# Patient Record
Sex: MALE | Race: WHITE | Employment: UNEMPLOYED | URBAN - METROPOLITAN AREA
[De-identification: names, ages, dates, MRNs, and addresses within clinical notes are randomized per-mention and may not be internally consistent; named-entity substitution may affect disease eponyms.]

---

## 2017-08-17 ENCOUNTER — HOSPITAL ENCOUNTER (OUTPATIENT)
Dept: GENERAL RADIOLOGY | Age: 62
Discharge: HOME | End: 2017-08-17
Attending: NURSE PRACTITIONER | Admitting: NURSE PRACTITIONER

## 2017-08-17 DIAGNOSIS — M54.5 LOW BACK PAIN, UNSPECIFIED BACK PAIN LATERALITY, UNSPECIFIED CHRONICITY, WITH SCIATICA PRESENCE UNSPECIFIED: ICD-10-CM

## 2017-08-22 ENCOUNTER — TELEPHONE (OUTPATIENT)
Dept: CARDIOLOGY CLINIC | Age: 62
End: 2017-08-22

## 2017-08-25 RX ORDER — NAPROXEN SODIUM 220 MG
220 TABLET ORAL
COMMUNITY
End: 2018-02-19 | Stop reason: ALTCHOICE

## 2017-08-25 RX ORDER — METHOCARBAMOL 750 MG/1
750 TABLET, FILM COATED ORAL 4 TIMES DAILY
COMMUNITY
End: 2017-09-06 | Stop reason: ALTCHOICE

## 2017-08-25 RX ORDER — SERTRALINE HYDROCHLORIDE 100 MG/1
100 TABLET, FILM COATED ORAL
COMMUNITY

## 2017-09-06 ENCOUNTER — INITIAL CONSULT (OUTPATIENT)
Dept: CARDIOLOGY CLINIC | Age: 62
End: 2017-09-06

## 2017-09-06 VITALS
BODY MASS INDEX: 24.57 KG/M2 | HEART RATE: 56 BPM | HEIGHT: 73 IN | DIASTOLIC BLOOD PRESSURE: 80 MMHG | WEIGHT: 185.4 LBS | SYSTOLIC BLOOD PRESSURE: 110 MMHG

## 2017-09-06 DIAGNOSIS — Z76.89 ESTABLISHING CARE WITH NEW DOCTOR, ENCOUNTER FOR: ICD-10-CM

## 2017-09-06 DIAGNOSIS — F51.01 PRIMARY INSOMNIA: ICD-10-CM

## 2017-09-06 DIAGNOSIS — R06.02 SOB (SHORTNESS OF BREATH): ICD-10-CM

## 2017-09-06 DIAGNOSIS — R00.1 BRADYCARDIA: Primary | ICD-10-CM

## 2017-09-06 DIAGNOSIS — R53.82 CHRONIC FATIGUE: ICD-10-CM

## 2017-09-06 PROBLEM — G47.00 INSOMNIA DISORDER: Status: ACTIVE | Noted: 2017-09-06

## 2017-09-06 PROCEDURE — 93000 ELECTROCARDIOGRAM COMPLETE: CPT | Performed by: INTERNAL MEDICINE

## 2017-09-06 PROCEDURE — 99244 OFF/OP CNSLTJ NEW/EST MOD 40: CPT | Performed by: INTERNAL MEDICINE

## 2017-09-07 ENCOUNTER — TELEPHONE (OUTPATIENT)
Dept: CARDIOLOGY CLINIC | Age: 62
End: 2017-09-07

## 2017-09-11 ENCOUNTER — NURSE ONLY (OUTPATIENT)
Dept: CARDIOLOGY CLINIC | Age: 62
End: 2017-09-11

## 2017-09-11 DIAGNOSIS — R00.1 BRADYCARDIA: Primary | ICD-10-CM

## 2017-09-11 DIAGNOSIS — Z76.89 ESTABLISHING CARE WITH NEW DOCTOR, ENCOUNTER FOR: ICD-10-CM

## 2017-09-11 DIAGNOSIS — F51.01 PRIMARY INSOMNIA: ICD-10-CM

## 2017-09-11 PROCEDURE — 93225 XTRNL ECG REC<48 HRS REC: CPT | Performed by: INTERNAL MEDICINE

## 2017-09-13 ENCOUNTER — PROCEDURE VISIT (OUTPATIENT)
Dept: CARDIOLOGY CLINIC | Age: 62
End: 2017-09-13

## 2017-09-13 DIAGNOSIS — R06.02 SOB (SHORTNESS OF BREATH): Primary | ICD-10-CM

## 2017-09-13 PROCEDURE — 93018 CV STRESS TEST I&R ONLY: CPT | Performed by: INTERNAL MEDICINE

## 2017-09-13 PROCEDURE — 93016 CV STRESS TEST SUPVJ ONLY: CPT | Performed by: INTERNAL MEDICINE

## 2017-09-14 ENCOUNTER — PROCEDURE VISIT (OUTPATIENT)
Dept: CARDIOLOGY CLINIC | Age: 62
End: 2017-09-14

## 2017-09-14 DIAGNOSIS — R00.1 BRADYCARDIA: ICD-10-CM

## 2017-09-14 DIAGNOSIS — F51.01 PRIMARY INSOMNIA: ICD-10-CM

## 2017-09-14 DIAGNOSIS — R53.82 CHRONIC FATIGUE: ICD-10-CM

## 2017-09-14 DIAGNOSIS — R00.1 BRADYCARDIA: Primary | ICD-10-CM

## 2017-09-14 DIAGNOSIS — R06.02 SOB (SHORTNESS OF BREATH): ICD-10-CM

## 2017-09-14 DIAGNOSIS — Z76.89 ESTABLISHING CARE WITH NEW DOCTOR, ENCOUNTER FOR: ICD-10-CM

## 2017-09-14 LAB
LV EF: 53 %
LV EF: 55 %
LVEF MODALITY: NORMAL
LVEF MODALITY: NORMAL

## 2017-09-14 PROCEDURE — A9500 TC99M SESTAMIBI: HCPCS | Performed by: INTERNAL MEDICINE

## 2017-09-14 PROCEDURE — 93016 CV STRESS TEST SUPVJ ONLY: CPT | Performed by: INTERNAL MEDICINE

## 2017-09-14 PROCEDURE — 93306 TTE W/DOPPLER COMPLETE: CPT | Performed by: INTERNAL MEDICINE

## 2017-09-14 PROCEDURE — 93017 CV STRESS TEST TRACING ONLY: CPT | Performed by: INTERNAL MEDICINE

## 2017-09-14 PROCEDURE — 78452 HT MUSCLE IMAGE SPECT MULT: CPT | Performed by: INTERNAL MEDICINE

## 2017-09-14 PROCEDURE — 93018 CV STRESS TEST I&R ONLY: CPT | Performed by: INTERNAL MEDICINE

## 2017-09-15 ENCOUNTER — TELEPHONE (OUTPATIENT)
Dept: CARDIOLOGY CLINIC | Age: 62
End: 2017-09-15

## 2017-09-19 ENCOUNTER — HOSPITAL ENCOUNTER (OUTPATIENT)
Dept: GENERAL RADIOLOGY | Age: 62
Discharge: HOME | End: 2017-09-19
Attending: INTERNAL MEDICINE | Admitting: INTERNAL MEDICINE

## 2017-09-19 ENCOUNTER — OFFICE VISIT (OUTPATIENT)
Dept: CARDIOLOGY CLINIC | Age: 62
End: 2017-09-19

## 2017-09-19 VITALS
BODY MASS INDEX: 24.78 KG/M2 | HEIGHT: 73 IN | DIASTOLIC BLOOD PRESSURE: 64 MMHG | WEIGHT: 187 LBS | SYSTOLIC BLOOD PRESSURE: 116 MMHG | HEART RATE: 57 BPM | OXYGEN SATURATION: 97 %

## 2017-09-19 DIAGNOSIS — R06.02 SOB (SHORTNESS OF BREATH): ICD-10-CM

## 2017-09-19 DIAGNOSIS — R94.39 ABNORMAL STRESS TEST: ICD-10-CM

## 2017-09-19 DIAGNOSIS — Z01.818 PRE-OP EXAM: ICD-10-CM

## 2017-09-19 DIAGNOSIS — R00.1 BRADYCARDIA: Primary | ICD-10-CM

## 2017-09-19 DIAGNOSIS — R53.82 CHRONIC FATIGUE: ICD-10-CM

## 2017-09-19 LAB
ANION GAP SERPL CALCULATED.3IONS-SCNC: 15 MMOL/L (ref 4–16)
APTT: 39.4 SECONDS (ref 21.2–33)
BUN BLDV-MCNC: 17 MG/DL (ref 6–23)
CALCIUM SERPL-MCNC: 9.3 MG/DL (ref 8.3–10.6)
CHLORIDE BLD-SCNC: 101 MMOL/L (ref 99–110)
CO2: 26 MMOL/L (ref 21–32)
CREAT SERPL-MCNC: 0.8 MG/DL (ref 0.9–1.3)
GFR AFRICAN AMERICAN: >60 ML/MIN/1.73M2
GFR NON-AFRICAN AMERICAN: >60 ML/MIN/1.73M2
GLUCOSE BLD-MCNC: 81 MG/DL (ref 70–140)
HCT VFR BLD CALC: 43.3 % (ref 42–52)
HEMOGLOBIN: 12.6 GM/DL (ref 13.5–18)
INR BLD: 1.12 INDEX
MCH RBC QN AUTO: 22.3 PG (ref 27–31)
MCHC RBC AUTO-ENTMCNC: 29.1 % (ref 32–36)
MCV RBC AUTO: 76.8 FL (ref 78–100)
PDW BLD-RTO: 17.7 % (ref 11.7–14.9)
PLATELET # BLD: 328 K/CU MM (ref 140–440)
PMV BLD AUTO: 10.4 FL (ref 7.5–11.1)
POTASSIUM SERPL-SCNC: 4.9 MMOL/L (ref 3.5–5.1)
PROTHROMBIN TIME: 12.8 SECONDS (ref 9.12–12.5)
RBC # BLD: 5.64 M/CU MM (ref 4.6–6.2)
SODIUM BLD-SCNC: 142 MMOL/L (ref 135–145)
WBC # BLD: 11.2 K/CU MM (ref 4–10.5)

## 2017-09-19 PROCEDURE — 99214 OFFICE O/P EST MOD 30 MIN: CPT | Performed by: INTERNAL MEDICINE

## 2017-09-19 RX ORDER — GABAPENTIN 100 MG/1
100 CAPSULE ORAL 2 TIMES DAILY
COMMUNITY
Start: 2017-09-14 | End: 2017-11-01 | Stop reason: ALTCHOICE

## 2017-09-20 ENCOUNTER — TELEPHONE (OUTPATIENT)
Dept: CARDIOLOGY CLINIC | Age: 62
End: 2017-09-20

## 2017-09-20 PROCEDURE — 93227 XTRNL ECG REC<48 HR R&I: CPT | Performed by: INTERNAL MEDICINE

## 2017-09-21 ENCOUNTER — HOSPITAL ENCOUNTER (OUTPATIENT)
Dept: CARDIAC CATH/INVASIVE PROCEDURES | Age: 62
Discharge: HOME | End: 2017-10-20
Attending: INTERNAL MEDICINE | Admitting: INTERNAL MEDICINE

## 2017-09-28 ENCOUNTER — TELEPHONE (OUTPATIENT)
Dept: CARDIOLOGY CLINIC | Age: 62
End: 2017-09-28

## 2017-10-24 ENCOUNTER — TELEPHONE (OUTPATIENT)
Dept: CARDIOLOGY CLINIC | Age: 62
End: 2017-10-24

## 2017-11-01 ENCOUNTER — TELEPHONE (OUTPATIENT)
Dept: CARDIOLOGY CLINIC | Age: 62
End: 2017-11-01

## 2017-11-01 ENCOUNTER — OFFICE VISIT (OUTPATIENT)
Dept: CARDIOLOGY CLINIC | Age: 62
End: 2017-11-01

## 2017-11-01 VITALS
HEART RATE: 60 BPM | WEIGHT: 185 LBS | HEIGHT: 74 IN | BODY MASS INDEX: 23.74 KG/M2 | SYSTOLIC BLOOD PRESSURE: 112 MMHG | DIASTOLIC BLOOD PRESSURE: 72 MMHG

## 2017-11-01 DIAGNOSIS — R06.02 SOB (SHORTNESS OF BREATH): ICD-10-CM

## 2017-11-01 DIAGNOSIS — R94.39 ABNORMAL STRESS TEST: Primary | ICD-10-CM

## 2017-11-01 DIAGNOSIS — R00.1 BRADYCARDIA: ICD-10-CM

## 2017-11-01 PROCEDURE — 99214 OFFICE O/P EST MOD 30 MIN: CPT | Performed by: INTERNAL MEDICINE

## 2017-11-01 NOTE — PROGRESS NOTES
Patient here in office and educated on LHC with poss PTCA, schedule for 11/06/17 @ 10:00, with arrival @ 8:00, @ [address]; risk explained; and consents signed. Also copy of orders given for labs and CXR due 11/03/17 at [facility]. Instruction given to patient to :  NPO after midnight the night before procedure; call hospital at [phone] to pre-register. May take rest of morning meds of procedure. Patient voiced understanding. Copies of consent & info sheet given to Nargis for scanning.

## 2017-11-01 NOTE — PROGRESS NOTES
CARDIOLOGY CONSULT NOTE    Chief Complaint: Fatigue / SOB     HPI:   Vito is a 60y.o. year old who has history as noted below. He had a stress test but was unable to complete that it was submaximal treadmill. He then had a Lexiscan which is abnormal showing anterior wall ischemia,He was advised to get heart cath but he did not show up for the procedure on the day of his heart cath. He says he decided he did not need it. He is still tired and feels dizzy when he stnds up. He is short of breath but denies any chest pain. He is being seen for ongoing shortness of breath and fatigue. He was noted to have bradycardia on his EKG . He says that he's been fatigued for more than a year now. When he goes out taking his dog for a walk, he has to stop several times because he is tired and gets short of breath. He denies any ankle swelling. He does not report any chest pressure or pain. He is not on any medication except for antidepressants. He used to smoke but quit many years ago. He does report that he does not sleep very well and is often up at night      Current Outpatient Prescriptions   Medication Sig Dispense Refill    sertraline (ZOLOFT) 100 MG tablet Take 100 mg by mouth Take 2 tablets daily      naproxen sodium (ALEVE) 220 MG tablet Take 220 mg by mouth       sertraline (ZOLOFT) 100 MG tablet Take 100 mg by mouth 2 times daily. No current facility-administered medications for this visit. Allergies:   Review of patient's allergies indicates no known allergies.     Patient History:  Past Medical History:   Diagnosis Date    Anxiety     Back problem     \"Lower Back Hurts Sometimes\"    Bipolar disorder ([facility] [address]     Depression     History of exercise stress test 09/13/2017    treadmill    History of nuclear stress test 09/14/2017    cardiolite-moderate ischemia anterior wall LAD    Hx of echocardiogram 09/14/2017    EF50-55%,mildly dilated left atrium [facility] OTHER MEDICAL     Primary Care Physician Is At [address] [place]    Panic attacks     Shortness of breath on exertion     Teeth missing     Upper And Lower    Wears glasses      Past Surgical History:   Procedure Laterality Date    INGUINAL HERNIA REPAIR Right 06/24/14     Family History   Problem Relation Age of Onset    Heart Attack Father     High Cholesterol Father     Cancer Father      Skin Cancer    Heart Disease Father      Heart Attack    Early Death Brother 58     Throat Cancer    Cancer Brother      Throat Cancer    Other Sister      Rheumatic Fever, Pituitary Gland Problems    Other Brother      Overweight, Back Problems     Social History   Substance Use Topics    Smoking status: Former Smoker     Packs/day: 1.00     Years: 21.00     Types: Cigarettes    Smokeless tobacco: Never Used    Alcohol use No      Comment: \"Quit 2012, Was Occ\"        Review of Systems:   · Constitutional: No Fever or Weight Loss   · Eyes: No Decreased Vision  · ENT: No Headaches, Hearing Loss or Vertigo  · Cardiovascular: as per note above   · Respiratory: No cough or wheezing and as per note above.    · Gastrointestinal: No abdominal pain, appetite loss, blood in stools, constipation, diarrhea or heartburn  · Genitourinary: No dysuria, trouble voiding, or hematuria  · Musculoskeletal:  None  · Integumentary: No rash or pruritis  · Neurological: No TIA or stroke symptoms  · Psychiatric: No anxiety or depression  · Endocrine: No malaise, fatigue or temperature intolerance  · Hematologic/Lymphatic: No bleeding problems, blood clots or swollen lymph nodes  · Allergic/Immunologic: No nasal congestion or hives    Objective:      Physical Exam:  /72   Pulse 60   Ht 6' 2\" (1.88 m)   Wt 185 lb (83.9 kg)   BMI 23.75 kg/m²   Wt Readings from Last 3 Encounters:   11/01/17 185 lb (83.9 kg)   09/19/17 187 lb (84.8 kg)   09/06/17 185 lb 6.4 oz (84.1 kg)     Body mass index is 23.75 noted to have PAC   Submaximal ECG stress test. consider lexiscan to evaluate further     Lexiscan 9/114/17  Abnormal Study/ abnormal stress test    Normal EF 55 % with normal ventricular contractility.    Medium size moderate ischemia of anterior wall in stress images consistent    with LAD ischemia     Echo: 9/14/17   Summary   Left ventricular function is normal.   Ejection fraction is visually estimated at 50% to 55%   Mildly dilated left atrium.   No significant valvular abnormalities.   No evidence of any pericardial effusion. Holter 48 Hrs 9/23/17  No significant bradycardia or pause , no signficnat SVE or VE burden    All labs, medications and tests reviewed by myself including data and history from outside source , patient and available family . Assessment & Plan:      1. Abnormal stress test    2. Bradycardia    3. SOB (shortness of breath)       Abnormal stress test  He has reduced excise tolerance and a stress test which shows Possible anterior wall ischemia. We will proceed with cardiac cath to evaluate further. Alternates and benefits were discussed in detail. Rolo's test is normal.He did not show up for his heart cath last time . He is tired all the time and prefers not to be on meds . WE will try calcium channel blockers for anti anginal instead of beat blockers ,. His main complaints are fatigue and dizziness. Bradycardia  No events of bradycardia     Chronic fatigue  This could be related to his insomnia. He says he takes afternoon naps because he is tired. He tells me that this part of her numbers were normal and they were recently checked. Check tsh and lipid panel      Dyslipidemia :  Max had lab work recently,  Check lipid panel    Counseled extensively and medication compliance urged. We discussed that for the  prevention of ASCVD our  goal is aggressive risk modification. Patient is encouraged to exercise even a brisk walk for 30 minutes  at least 3 to 4 times a week   Various

## 2017-11-01 NOTE — TELEPHONE ENCOUNTER
Left a message for the patient instructing him that I left more lab orders at the  that he will need to be fasting foe but can have them done on Friday with the pre labs for the [facility].  If you have any questions, please call our office at [phone]

## 2017-11-01 NOTE — LETTER
[facility]     LEFT HEART CATHETERIZATION WITH POSSIBLE PERCUTANEOUS CORONARY INTERVENTION     Patient Name: Albertina Vazquez   : 1955   MRN# [id number]    Date of Procedure: 17 Time: 10:00 Arrival Time: 8:00    The catheterization and angiogram are usually outpatient procedures, however if stenting is needed you will stay overnight. You will need to be at the hospital two hours before the procedure. You will need to arrange for someone to drive you home. You will go to registration in the main lobby. HOSPITAL:  [facility]  Call to Pre-[place] at: [phone] 1-2 days before your procedure. Please have blood work and chest-x-ray done 1 to 2 days before procedure at    [facility]. X Please do not have anything by mouth after midnight prior to or 8 hours  before the procedure. X You may take your medications with a sip of water in the morning before  your procedure or take them with you. Patient Signature:  _________________________ Staff Signature: William Paget Dr. Marland Fears     Patient Name: Albertina Vazquez   : 1955   MRN# [id number]    Date of Procedure: 17 Time: 10:00      LEFT HEART CATHETERIZATION WITH POSSIBLE PERCUTANEOUS CORONARY INTERVENTION        X Chest x-Ray PA & Lateral View    X Type & Screen     X CBC  X BMP  X PT  X PTT            ? PLEASE CALL ABNORMAL RESULTS TO THE  PHYSICIAN? ATTENTION PATIENT: Pretesting is to be done before the cath. You do not have to fast for the lab work. You must go to the [facility] behind [facility] at [address] [place]. to have this lab work done.   Phone: [phone] Hours: 7:00 am to 5:00 pm             PHYSICIAN SIGNATURE:      DATE: ? Observers or use of photography, video/audio recording, or televising of the procedure(s). This is for medical, scientific, or educational purposes. This includes appropriate portions of my body. My identity will not be revealed. ? I consent to release of my social security number and other identifying information to [address] (FDA), and the supplier/, if I receive tissue, a device, or implant. This is to track the tissue, device, or implant for defect, recall, infection, etc.     ? Use of blood and/or blood products, if needed, through my hospital stay. My practitioner has advised me of the risks of using, risks of not using, benefits, side effects, and alternatives. ___ I do NOT want Blood or Blood products given. (Complete separate  refusal form)    Code Status (gabby one):  ___ I do NOT HAVE a DNR order. I am a Full-code.   I will receive CPR, intubation,  chest compressions, medications, and/or other life saving measures if I have a  cardiac or respiratory arrest.    ___ I have a Do Not Resuscitate (DNR)order.   (gabby one below)  ___  I rescind my DNR for surgery and immediate post-operative period through Phase 2 recovery. This means, for that time period, I will be a Full-code and receive CPR, intubation, chest compressions, medications, and/or other life saving measures, if I have a cardiac or respiratory arrest.    ___ I WANT to keep my DNR in effect during my procedure(s) and immediate post-operative recovery period through Phase 2 recovery. (Complete separate refusal form)     This form has been fully explained to me. I understand its contents.       Patients Signature: ___________________________Date: ________  Time: ________    If patient unable to sign, has engaged the [address] [place] [address] in2nite, is a minor, or has a court-appointed Guardian:  [facility] Representative Name (Print): ____________________________________      Relationship (Upson one):    Guardian   Parent    Spouse    HCPOA   Child   Sibling  Next-of-Kin Friend    Patients Representative Signature: _______________________________________              Date: ______________  Time: __________    An  was used.  name/ID: _________________________________      [facility] Witness________________________  Date: ________   Time: _________    Physician/Practitioner _______________________  Date: ________   Time: _________         Revision 2017        Darryle Cocks Dr. Larita Gable Rizvi    PROCEDURE TO SCHEDULE:    LEFT HEART CATHETERIZATION WITH POSSIBLE PERCUTANEOUS CORONARY INTERVENTION       Patient Name: Selina Santiago   : 1955   MRN# [id number]    Home Phone Number: [phone]   Weight:    Wt Readings from Last 3 Encounters:   17 185 lb (83.9 kg)   17 187 lb (84.8 kg)   17 185 lb 6.4 oz (84.1 kg)        Insurance: Payor: / No coverage found.     Date of Procedure: 17 Time: 10:00 Arrival Time: 8:00    Diagnosis:    Allergies: No Known Allergies     1) Call [facility] scheduling ([phone]) or Instant Message  CONFIRMED WITH     PHONE OR   INSTANT MESSAGE  2) PREAUTHORIZATION NUMBER:    Spoke to:      From date:     expiration date:        Sosa Valverde

## 2017-11-03 ENCOUNTER — HOSPITAL ENCOUNTER (OUTPATIENT)
Dept: GENERAL RADIOLOGY | Age: 62
Discharge: HOME | End: 2017-11-03
Attending: INTERNAL MEDICINE | Admitting: INTERNAL MEDICINE

## 2017-11-03 DIAGNOSIS — Z01.811 PRE-OP CHEST EXAM: ICD-10-CM

## 2017-11-03 LAB
ANION GAP SERPL CALCULATED.3IONS-SCNC: 13 MMOL/L (ref 4–16)
APTT: 39.6 SECONDS (ref 21.2–33)
BUN BLDV-MCNC: 20 MG/DL (ref 6–23)
CALCIUM SERPL-MCNC: 9 MG/DL (ref 8.3–10.6)
CHLORIDE BLD-SCNC: 98 MMOL/L (ref 99–110)
CHOLESTEROL: 153 MG/DL
CO2: 27 MMOL/L (ref 21–32)
CREAT SERPL-MCNC: 0.9 MG/DL (ref 0.9–1.3)
GFR AFRICAN AMERICAN: >60 ML/MIN/1.73M2
GFR NON-AFRICAN AMERICAN: >60 ML/MIN/1.73M2
GLUCOSE BLD-MCNC: 87 MG/DL (ref 70–140)
HCT VFR BLD CALC: 42.2 % (ref 42–52)
HDLC SERPL-MCNC: 39 MG/DL
HEMOGLOBIN: 12.4 GM/DL (ref 13.5–18)
INR BLD: 1.18 INDEX
LDL CHOLESTEROL DIRECT: 94 MG/DL
MCH RBC QN AUTO: 22.1 PG (ref 27–31)
MCHC RBC AUTO-ENTMCNC: 29.4 % (ref 32–36)
MCV RBC AUTO: 75.4 FL (ref 78–100)
PDW BLD-RTO: 18 % (ref 11.7–14.9)
PLATELET # BLD: 335 K/CU MM (ref 140–440)
PMV BLD AUTO: 9.8 FL (ref 7.5–11.1)
POTASSIUM SERPL-SCNC: 4.7 MMOL/L (ref 3.5–5.1)
PROTHROMBIN TIME: 13.5 SECONDS (ref 9.12–12.5)
RBC # BLD: 5.6 M/CU MM (ref 4.6–6.2)
SODIUM BLD-SCNC: 138 MMOL/L (ref 135–145)
TRIGL SERPL-MCNC: 104 MG/DL
TSH HIGH SENSITIVITY: 1.92 UIU/ML (ref 0.27–4.2)
WBC # BLD: 11.5 K/CU MM (ref 4–10.5)

## 2017-11-08 ENCOUNTER — TELEPHONE (OUTPATIENT)
Dept: CARDIOLOGY CLINIC | Age: 62
End: 2017-11-08

## 2017-11-08 DIAGNOSIS — J84.10 LUNG GRANULOMA (HCC): ICD-10-CM

## 2017-11-08 DIAGNOSIS — R93.89 ABNORMAL CHEST X-RAY: Primary | ICD-10-CM

## 2017-11-08 NOTE — TELEPHONE ENCOUNTER
Left a messge for the patient to call back for the chest x ray results. FINDINGS:  The lungs are clear other than a chronic calcified right mid lung granuloma. The cardiac and mediastinal contours are normal.  There is no pleural  effusion or pneumothorax. No acute osseous abnormality is identified.     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

## 2017-11-09 ENCOUNTER — HOSPITAL ENCOUNTER (OUTPATIENT)
Dept: NUCLEAR MEDICINE | Age: 62
Discharge: HOME | End: 2017-12-08
Attending: NURSE PRACTITIONER | Admitting: NURSE PRACTITIONER

## 2017-11-09 DIAGNOSIS — E04.1 NONTOXIC SINGLE THYROID NODULE: ICD-10-CM

## 2017-11-10 NOTE — TELEPHONE ENCOUNTER
2nd Attempt to call the patient with the results of the chest xray and someone picks up the phone and hangs up. FINDINGS:  The lungs are clear other than a chronic calcified right mid lung granuloma. The cardiac and mediastinal contours are normal.  There is no pleural  effusion or pneumothorax.  No acute osseous abnormality is identified.     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

## 2017-11-13 ENCOUNTER — TELEPHONE (OUTPATIENT)
Dept: CARDIOLOGY CLINIC | Age: 62
End: 2017-11-13

## 2017-11-13 NOTE — TELEPHONE ENCOUNTER
3rd Attempt Left a message for the patientto call the office back fro the resutls of the chest X ray.      FINDINGS:  The lungs are clear other than a chronic calcified right mid lung granuloma. The cardiac and mediastinal contours are normal.  There is no pleural  effusion or pneumothorax.  No acute osseous abnormality is identified.     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

## 2017-11-13 NOTE — TELEPHONE ENCOUNTER
Patient returned my call about the chest X Ray results but the call was lost some how I called the patient right back and got the Voicemail left a message with my direct number. I spoke to the patient and he was given the results and ok to see a pulmonology. Sent referral to [facility]. FINDINGS:  The lungs are clear other than a chronic calcified right mid lung granuloma. The cardiac and mediastinal contours are normal.  There is no pleural  effusion or pneumothorax.  No acute osseous abnormality is identified.     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

## 2017-11-13 NOTE — TELEPHONE ENCOUNTER
Called to schedule consult per Dr Alia Jade to discuss possible pacemaker. Patient states he has appointment with Dr Alia Jade also on 12/1, I advised him we will cancel that and we can reschedule at a later time. Pt voiced understanding.

## 2017-11-29 ENCOUNTER — INITIAL CONSULT (OUTPATIENT)
Dept: PULMONOLOGY | Age: 62
End: 2017-11-29

## 2017-11-29 ENCOUNTER — HOSPITAL ENCOUNTER (OUTPATIENT)
Dept: GENERAL RADIOLOGY | Age: 62
Discharge: HOME | End: 2017-11-29
Attending: INTERNAL MEDICINE | Admitting: INTERNAL MEDICINE

## 2017-11-29 VITALS
OXYGEN SATURATION: 93 % | DIASTOLIC BLOOD PRESSURE: 64 MMHG | HEIGHT: 74 IN | RESPIRATION RATE: 18 BRPM | WEIGHT: 182 LBS | BODY MASS INDEX: 23.36 KG/M2 | SYSTOLIC BLOOD PRESSURE: 100 MMHG | HEART RATE: 58 BPM

## 2017-11-29 DIAGNOSIS — R00.1 BRADYCARDIA: ICD-10-CM

## 2017-11-29 DIAGNOSIS — Z87.891 FORMER SMOKER: ICD-10-CM

## 2017-11-29 DIAGNOSIS — R06.02 SOB (SHORTNESS OF BREATH): ICD-10-CM

## 2017-11-29 DIAGNOSIS — R91.1 LUNG NODULE: Primary | ICD-10-CM

## 2017-11-29 LAB
ANION GAP SERPL CALCULATED.3IONS-SCNC: 11 MMOL/L (ref 4–16)
BUN BLDV-MCNC: 18 MG/DL (ref 6–23)
CALCIUM SERPL-MCNC: 9.1 MG/DL (ref 8.3–10.6)
CHLORIDE BLD-SCNC: 100 MMOL/L (ref 99–110)
CO2: 28 MMOL/L (ref 21–32)
CREAT SERPL-MCNC: 0.8 MG/DL (ref 0.9–1.3)
D DIMER: 394 NG/ML(DDU)
DLCO %PRED: NORMAL
DLCO PRE: NORMAL
FEF 25-75%-POST: 2.85
FEF 25-75%-PRE: 3.04
FEV1-POST: 3.16
FEV1-PRE: 3.5
FEV1/FVC-POST: 68.5
FEV1/FVC-PRE: 76.7
FVC-POST: 4.62
FVC-PRE: 4.56
GFR AFRICAN AMERICAN: >60 ML/MIN/1.73M2
GFR NON-AFRICAN AMERICAN: >60 ML/MIN/1.73M2
GLUCOSE BLD-MCNC: 109 MG/DL (ref 70–99)
MEP: NORMAL
MIP: NORMAL
POTASSIUM SERPL-SCNC: 4.3 MMOL/L (ref 3.5–5.1)
PRO-BNP: 517.9 PG/ML
SODIUM BLD-SCNC: 139 MMOL/L (ref 135–145)
TLC %PRED: NORMAL
TLC PRE: NORMAL

## 2017-11-29 PROCEDURE — G8427 DOCREV CUR MEDS BY ELIG CLIN: HCPCS | Performed by: INTERNAL MEDICINE

## 2017-11-29 PROCEDURE — 99204 OFFICE O/P NEW MOD 45 MIN: CPT | Performed by: INTERNAL MEDICINE

## 2017-11-29 PROCEDURE — 3017F COLORECTAL CA SCREEN DOC REV: CPT | Performed by: INTERNAL MEDICINE

## 2017-11-29 PROCEDURE — G8420 CALC BMI NORM PARAMETERS: HCPCS | Performed by: INTERNAL MEDICINE

## 2017-11-29 PROCEDURE — G8484 FLU IMMUNIZE NO ADMIN: HCPCS | Performed by: INTERNAL MEDICINE

## 2017-11-29 PROCEDURE — 1036F TOBACCO NON-USER: CPT | Performed by: INTERNAL MEDICINE

## 2017-11-29 ASSESSMENT — PULMONARY FUNCTION TESTS
FVC_PRE: 4.56
FEV1/FVC_POST: 68.5
FEV1/FVC_PRE: 76.7
FVC_POST: 4.62
FEV1_POST: 3.16
FEV1_PRE: 3.50

## 2017-11-29 NOTE — PROGRESS NOTES
Subjective:   CHIEF COMPLAINT / HPI: Vito Carbajal is a 50-year-old male referred here by Dr. Moncho Mishra for evaluation of lung nodule and shortness of breath. He states that over the past year he's noted progressive worsening dyspnea on exertion but is not experiencing shortness of breath at rest.  He was a former pack-a-day smoker and sometimes more for approximately 2025 years. He quit smoking over 20 years ago. He is never been told he has COPD in the past.  He rarely gets episodes of bronchitis. He denies cough or chest congestion and has minimal sputum expectoration. He states that there is a family history of throat cancer but no family history of lung cancer or COPD. He is being evaluated by cardiology for bradycardia and an abnormal stress test in recently underwent cardiac catheterization. He denies a history of obstructive sleep apnea or heavy snoring. 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         Past Medical History:  Past Medical History:   Diagnosis Date    Anxiety     Back problem     \"Lower Back Hurts Sometimes\"    B[facility] disorder ([facility] [address]     Depression     Former smoker 11/29/2017    History of exercise stress test 09/13/2017    treadmill    History of nuclear stress test 09/14/2017    cardiolite-moderate ischemia anterior wall LAD    Hx of echocardiogram 09/14/2017    EF50-55%,mildly dilated left atrium    HX OTHER MEDICAL     [facility] Physician [facility] At [facility]    Lung nodule 11/29/2017    Panic attacks     Shortness of breath on exertion     Teeth missing     Upper And Lower    Wears glasses        Current Medications:    No current facility-administered medications for this visit.      No Known Allergies    Social History:    Social History     Social History    Marital status: Single     Spouse name: N/A    Number of children: N/A    Years of education: N/A     Social History Main Topics    Smoking status: Former Smoker     Packs/day: 1.00     Years: 21.00     Types: Cigarettes     Quit date: 11/29/1992    Smokeless tobacco: Never Used    Alcohol use No      Comment: \"Quit 2012, Was Occ\"    Drug use: No    Sexual activity: Not Currently     Other Topics Concern    None     Social History Narrative    ** Merged History Encounter **            Family History:    Family History   Problem Relation Age of Onset    Heart Attack Father     High Cholesterol Father     Cancer Father      Skin Cancer    Heart Disease Father      Heart Attack    Early Death Brother 58     Throat Cancer    Cancer Brother      Throat Cancer    Other Sister      Rheumatic Fever, Pituitary Gland Problems    Other Brother      Overweight, Back Problems         REVIEW OF SYSTEMS:    CONSTITUTIONAL:  negative for fevers, chills, diaphoresis,  appetite change, night sweats and unexpected weight change.    HEENT:  negative for hearing loss,  sinus pressure, nasal congestion, epistaxis and snoring  RESPIRATORY:  See HPI  CARDIOVASCULAR:  Negative for chest pain,  exertional chest pressure/discomfort, edema, syncope  GASTROINTESTINAL: negative for nausea, vomiting, diarrhea, constipation, blood in stool and abdominal pain  GENITOURINARY:  negative for frequency, dysuria and hematuria  HEMATOLOGIC/LYMPHATIC:  negative for easy bruising, bleeding and lymphadenopathy  ALLERGIC/IMMUNOLOGIC:  negative for recurrent infections, angioedema, anaphylaxis and drug reaction  MUSCULOSKELETAL:  negative for  pain, joint swelling, decreased range of motion and muscle weakness    Objective:   PHYSICAL EXAM:      VITALS:    Vitals:    11/29/17 1055   BP: 100/64   Pulse: 58   Resp: 18   SpO2: 93%   Weight: 182 lb (82.6 kg)   Height: 6' 2\" (1.88 m)         CONSTITUTIONAL:  awake, alert, cooperative, no apparent distress, and appears stated age, Thin and not overweight  NECK:  Supple and nontender,  trachea midline, no adenopathy, thyroid nl, no JVD, no wheezing or within this office note. Any errors should be brought immediately to my attention for correction. All efforts were made to ensure that this office note is accurate.

## 2017-12-01 ENCOUNTER — INITIAL CONSULT (OUTPATIENT)
Dept: CARDIOLOGY CLINIC | Age: 62
End: 2017-12-01

## 2017-12-01 VITALS
HEART RATE: 66 BPM | WEIGHT: 185 LBS | BODY MASS INDEX: 23.74 KG/M2 | HEIGHT: 74 IN | SYSTOLIC BLOOD PRESSURE: 116 MMHG | DIASTOLIC BLOOD PRESSURE: 88 MMHG

## 2017-12-01 DIAGNOSIS — R00.1 BRADYCARDIA: ICD-10-CM

## 2017-12-01 DIAGNOSIS — I45.89 CHRONOTROPIC INCOMPETENCE: Primary | ICD-10-CM

## 2017-12-01 PROCEDURE — G8420 CALC BMI NORM PARAMETERS: HCPCS | Performed by: INTERNAL MEDICINE

## 2017-12-01 PROCEDURE — 93000 ELECTROCARDIOGRAM COMPLETE: CPT | Performed by: INTERNAL MEDICINE

## 2017-12-01 PROCEDURE — 3017F COLORECTAL CA SCREEN DOC REV: CPT | Performed by: INTERNAL MEDICINE

## 2017-12-01 PROCEDURE — G8427 DOCREV CUR MEDS BY ELIG CLIN: HCPCS | Performed by: INTERNAL MEDICINE

## 2017-12-01 PROCEDURE — 99204 OFFICE O/P NEW MOD 45 MIN: CPT | Performed by: INTERNAL MEDICINE

## 2017-12-01 PROCEDURE — G8484 FLU IMMUNIZE NO ADMIN: HCPCS | Performed by: INTERNAL MEDICINE

## 2017-12-01 NOTE — PROGRESS NOTES
Electrophysiology Consult Note      Reason for consultation: Need for pacecmaker    Chief complaint : Shortness of breath    Referring physician:  Dr. Elly Prasad      Primary care physician: Ahsan Stewart CNP      History of Present Illness:     Chief Complaint   Patient presents with    Bradycardia     Pt is here to discuss poss PPM. Pt states he gets short of breath with exertion. Pt states he has some dizziness upon standing. Pt denies palpitations and edema. Past medical history:   Past Medical History:   Diagnosis Date    Anxiety     Back problem     \"Lower Back Hurts Sometimes\"    Bipolar disorder ([address]     Depression     Former smoker 11/29/2017    History of exercise stress test 09/13/2017    treadmill    History of nuclear stress test 09/14/2017    cardiolite-moderate ischemia anterior wall LAD    Hx of echocardiogram 09/14/2017    EF50-55%,mildly dilated left atrium    HX OTHER MEDICAL     [facility] Care Physician Is At [facility]    Lung nodule 11/29/2017    Panic attacks     Shortness of breath on exertion     Teeth missing     Upper And Lower    Wears glasses        Surgical history :   Past Surgical History:   Procedure Laterality Date    INGUINAL HERNIA REPAIR Right 06/24/14       Family history:   Family History   Problem Relation Age of Onset    Heart Attack Father     High Cholesterol Father     Cancer Father      Skin Cancer    Heart Disease Father      Heart Attack    Early Death Brother 58     Throat Cancer    Cancer Brother      Throat Cancer    Other Sister      Rheumatic Fever, Pituitary Gland Problems    Other Brother      Overweight, Back Problems       Social history :  reports that he quit smoking about 25 years ago. His smoking use included Cigarettes. He has a 21.00 pack-year smoking history. He has never used smokeless tobacco. He reports that he does not drink alcohol or use drugs.     No Known Allergies    Current Outpatient Prescriptions on File Prior to Visit   Medication Sig Dispense Refill    sertraline (ZOLOFT) 100 MG tablet Take 100 mg by mouth Take 2 tablets daily      naproxen sodium (ALEVE) 220 MG tablet Take 220 mg by mouth        No current facility-administered medications on file prior to visit. Review of Systems:   Review of Systems   Constitutional: Positive for fatigue. Negative for activity change, chills and fever. HENT: Negative for congestion, ear pain and tinnitus. Eyes: Negative for photophobia, pain and visual disturbance. Respiratory: Positive for shortness of breath (WITH EXERTION). Negative for cough, chest tightness and wheezing. Cardiovascular: Negative for chest pain, palpitations and leg swelling. Gastrointestinal: Negative for abdominal pain, blood in stool, constipation, diarrhea, nausea and vomiting. Endocrine: Negative for cold intolerance and heat intolerance. Genitourinary: Negative for dysuria, flank pain and hematuria. Musculoskeletal: Positive for arthralgias. Negative for back pain, myalgias and neck stiffness. Skin: Negative for color change and rash. Allergic/Immunologic: Negative for food allergies. Neurological: Negative for dizziness, light-headedness, numbness and headaches. Hematological: Does not bruise/bleed easily. Psychiatric/Behavioral: Negative for agitation, behavioral problems and confusion. Physical Examination:    /88 (Position: Standing)   Pulse 66   Ht 6' 2\" (1.88 m)   Wt 185 lb (83.9 kg)   BMI 23.75 kg/m²    Wt Readings from Last 3 Encounters:   12/01/17 185 lb (83.9 kg)   11/29/17 182 lb (82.6 kg)   11/06/17 185 lb (83.9 kg)     Body mass index is 23.75 kg/m². Physical Exam   Constitutional: He is oriented to person, place, and time and well-developed, well-nourished, and in no distress. HENT:   Head: Normocephalic and atraumatic.    Eyes: Conjunctivae and EOM are normal. Pupils are equal, round, and reactive to light. Right eye exhibits no discharge. Neck: Normal range of motion. No JVD present. No thyromegaly present. Cardiovascular: Normal rate, regular rhythm and normal heart sounds. Exam reveals no friction rub. No murmur heard. Pulmonary/Chest: Effort normal and breath sounds normal. No stridor. No respiratory distress. He has no wheezes. Abdominal: Soft. Bowel sounds are normal. He exhibits no distension. There is no tenderness. Musculoskeletal: Normal range of motion. He exhibits no edema or tenderness. Neurological: He is alert and oriented to person, place, and time. He displays normal reflexes. No cranial nerve deficit. Coordination normal.   Skin: Skin is warm and dry. No rash noted. No erythema. Psychiatric: Mood and affect normal.         CBC:   Lab Results   Component Value Date    WBC 11.5 11/03/2017    HGB 12.4 11/03/2017    HCT 42.2 11/03/2017     11/03/2017     Lipids:   Lab Results   Component Value Date    CHOL 153 11/03/2017    TRIG 104 11/03/2017    HDL 39 (L) 11/03/2017    LDLDIRECT 94 11/03/2017     PT/INR:   Lab Results   Component Value Date    INR 1.18 11/03/2017        BMP:    Lab Results   Component Value Date     11/29/2017    K 4.3 11/29/2017     11/29/2017    CO2 28 11/29/2017    BUN 18 11/29/2017     CMP: No results found for: AST, ALB, PROT, BILITOT, ALKPHOS  TSH:  No results found for: TSH    EKGINTERPRETATION - EKG Interpretation:  Sinus bradycardia, Incomplete rbbb      IMPRESSION / RECOMMENDATIONS:     1. Chronotropic incompetence  2. Sinus bradycardia  3. Bipolar disorder  4.  Depression      Patient with shortness of breath on exertion  Only able to reach 65% of age related HR response at peak stress with out any AVN blocking agent and shortness of breath with it  SInus bradycardia at times with dizziness    Could consider pacemaker for chronotropic incompetence  Patient to consider and let us know          Thanks again for allowing me to participate in care of this patient. Please call me if you have any questions. With best regards.       Juancho Real MD, 12/10/2017 10:09 PM

## 2017-12-08 ENCOUNTER — TELEPHONE (OUTPATIENT)
Dept: CARDIOLOGY CLINIC | Age: 62
End: 2017-12-08

## 2017-12-10 ASSESSMENT — ENCOUNTER SYMPTOMS
CHEST TIGHTNESS: 0
CONSTIPATION: 0
ABDOMINAL PAIN: 0
NAUSEA: 0
EYE PAIN: 0
BACK PAIN: 0
PHOTOPHOBIA: 0
COUGH: 0
BLOOD IN STOOL: 0
DIARRHEA: 0
WHEEZING: 0
SHORTNESS OF BREATH: 1
COLOR CHANGE: 0
VOMITING: 0

## 2017-12-12 ENCOUNTER — HOSPITAL ENCOUNTER (OUTPATIENT)
Dept: CT IMAGING | Age: 62
Discharge: HOME | End: 2017-12-12
Attending: INTERNAL MEDICINE | Admitting: INTERNAL MEDICINE

## 2017-12-12 DIAGNOSIS — R91.1 LUNG NODULE: ICD-10-CM

## 2017-12-12 DIAGNOSIS — E04.1 NONTOXIC SINGLE THYROID NODULE: ICD-10-CM

## 2017-12-13 ENCOUNTER — TELEPHONE (OUTPATIENT)
Dept: PULMONOLOGY | Age: 62
End: 2017-12-13

## 2017-12-14 ENCOUNTER — TELEPHONE (OUTPATIENT)
Dept: CARDIOLOGY CLINIC | Age: 62
End: 2017-12-14

## 2017-12-14 NOTE — TELEPHONE ENCOUNTER
I have tried to call Mr. Dina Sanders on 2 separate occasions and he has not return the call. I did speak to [facility] concerning his abnormal CT chest suggesting a right kidney mass. He will need a workup for this. If we don't hear from him in the next 24-48 hours I would consider sending a certified mail to his house.

## 2018-01-01 ENCOUNTER — HOSPITAL ENCOUNTER (OUTPATIENT)
Age: 63
Setting detail: SPECIMEN
Discharge: HOME | End: 2018-10-08
Payer: COMMERCIAL

## 2018-01-01 ENCOUNTER — HOSPITAL ENCOUNTER (OUTPATIENT)
Dept: OTHER | Age: 63
Discharge: HOME | End: 2018-09-17
Attending: INTERNAL MEDICINE | Admitting: INTERNAL MEDICINE

## 2018-01-01 ENCOUNTER — HOSPITAL ENCOUNTER (OUTPATIENT)
Age: 63
Setting detail: SPECIMEN
Discharge: HOME | End: 2018-12-10
Payer: COMMERCIAL

## 2018-01-01 ENCOUNTER — HOSPITAL ENCOUNTER (OUTPATIENT)
Dept: OTHER | Age: 63
Discharge: HOME | End: 2018-08-20
Attending: INTERNAL MEDICINE | Admitting: INTERNAL MEDICINE

## 2018-01-01 ENCOUNTER — HOSPITAL ENCOUNTER (OUTPATIENT)
Age: 63
Setting detail: SPECIMEN
Discharge: HOME | End: 2018-11-05
Payer: COMMERCIAL

## 2018-01-01 ENCOUNTER — HOSPITAL ENCOUNTER (OUTPATIENT)
Dept: OTHER | Age: 63
Discharge: HOME | End: 2018-06-28
Attending: INTERNAL MEDICINE | Admitting: INTERNAL MEDICINE

## 2018-01-01 ENCOUNTER — HOSPITAL ENCOUNTER (OUTPATIENT)
Dept: MRI IMAGING | Age: 63
Discharge: HOME | End: 2018-07-25
Attending: INTERNAL MEDICINE | Admitting: INTERNAL MEDICINE

## 2018-01-01 ENCOUNTER — HOSPITAL ENCOUNTER (OUTPATIENT)
Dept: GENERAL RADIOLOGY | Age: 63
Discharge: HOME | End: 2018-07-23
Attending: INTERNAL MEDICINE | Admitting: INTERNAL MEDICINE

## 2018-01-01 ENCOUNTER — OFFICE VISIT (OUTPATIENT)
Dept: PULMONOLOGY | Age: 63
End: 2018-01-01

## 2018-01-01 VITALS
HEART RATE: 75 BPM | BODY MASS INDEX: 18.35 KG/M2 | DIASTOLIC BLOOD PRESSURE: 68 MMHG | SYSTOLIC BLOOD PRESSURE: 124 MMHG | RESPIRATION RATE: 16 BRPM | OXYGEN SATURATION: 96 % | WEIGHT: 143 LBS | HEIGHT: 74 IN

## 2018-01-01 DIAGNOSIS — C64.1 MALIGNANT NEOPLASM OF RIGHT KIDNEY, EXCEPT RENAL PELVIS (HCC): ICD-10-CM

## 2018-01-01 DIAGNOSIS — R91.1 LUNG NODULE: Primary | ICD-10-CM

## 2018-01-01 DIAGNOSIS — C79.51 BONE METASTASIS (HCC): ICD-10-CM

## 2018-01-01 DIAGNOSIS — R06.02 SOB (SHORTNESS OF BREATH): ICD-10-CM

## 2018-01-01 LAB
ALBUMIN SERPL-MCNC: 3 GM/DL (ref 3.4–5)
ALBUMIN SERPL-MCNC: 3 GM/DL (ref 3.4–5)
ALBUMIN SERPL-MCNC: 3.1 GM/DL (ref 3.4–5)
ALBUMIN SERPL-MCNC: 3.3 GM/DL (ref 3.4–5)
ALBUMIN SERPL-MCNC: 3.6 GM/DL (ref 3.4–5)
ALBUMIN SERPL-MCNC: 3.8 GM/DL (ref 3.4–5)
ALP BLD-CCNC: 83 IU/L (ref 40–129)
ALP BLD-CCNC: 85 IU/L (ref 40–129)
ALP BLD-CCNC: 88 IU/L (ref 40–129)
ALP BLD-CCNC: 90 IU/L (ref 40–129)
ALP BLD-CCNC: 90 IU/L (ref 40–129)
ALP BLD-CCNC: 93 IU/L (ref 40–129)
ALT SERPL-CCNC: 17 U/L (ref 10–40)
ALT SERPL-CCNC: 23 U/L (ref 10–40)
ALT SERPL-CCNC: 26 U/L (ref 10–40)
ALT SERPL-CCNC: 26 U/L (ref 10–40)
ALT SERPL-CCNC: 33 U/L (ref 10–40)
ALT SERPL-CCNC: 38 U/L (ref 10–40)
ANION GAP SERPL CALCULATED.3IONS-SCNC: 10 MMOL/L (ref 4–16)
ANION GAP SERPL CALCULATED.3IONS-SCNC: 12 MMOL/L (ref 4–16)
ANION GAP SERPL CALCULATED.3IONS-SCNC: 12 MMOL/L (ref 4–16)
ANION GAP SERPL CALCULATED.3IONS-SCNC: 14 MMOL/L (ref 4–16)
AST SERPL-CCNC: 18 IU/L (ref 15–37)
AST SERPL-CCNC: 19 IU/L (ref 15–37)
AST SERPL-CCNC: 22 IU/L (ref 15–37)
AST SERPL-CCNC: 26 IU/L (ref 15–37)
AST SERPL-CCNC: 27 IU/L (ref 15–37)
AST SERPL-CCNC: 33 IU/L (ref 15–37)
BILIRUB SERPL-MCNC: 0.1 MG/DL (ref 0–1)
BILIRUB SERPL-MCNC: 0.2 MG/DL (ref 0–1)
BILIRUB SERPL-MCNC: 0.3 MG/DL (ref 0–1)
BILIRUB SERPL-MCNC: 0.3 MG/DL (ref 0–1)
BUN BLDV-MCNC: 13 MG/DL (ref 6–23)
BUN BLDV-MCNC: 13 MG/DL (ref 6–23)
BUN BLDV-MCNC: 14 MG/DL (ref 6–23)
BUN BLDV-MCNC: 14 MG/DL (ref 6–23)
BUN BLDV-MCNC: 16 MG/DL (ref 6–23)
BUN BLDV-MCNC: 19 MG/DL (ref 6–23)
CALCIUM SERPL-MCNC: 8.2 MG/DL (ref 8.3–10.6)
CALCIUM SERPL-MCNC: 8.3 MG/DL (ref 8.3–10.6)
CALCIUM SERPL-MCNC: 8.9 MG/DL (ref 8.3–10.6)
CALCIUM SERPL-MCNC: 9.1 MG/DL (ref 8.3–10.6)
CALCIUM SERPL-MCNC: 9.5 MG/DL (ref 8.3–10.6)
CALCIUM SERPL-MCNC: 9.5 MG/DL (ref 8.3–10.6)
CHLORIDE BLD-SCNC: 100 MMOL/L (ref 99–110)
CHLORIDE BLD-SCNC: 101 MMOL/L (ref 99–110)
CHLORIDE BLD-SCNC: 102 MMOL/L (ref 99–110)
CHLORIDE BLD-SCNC: 96 MMOL/L (ref 99–110)
CHLORIDE BLD-SCNC: 98 MMOL/L (ref 99–110)
CHLORIDE BLD-SCNC: 99 MMOL/L (ref 99–110)
CO2: 24 MMOL/L (ref 21–32)
CO2: 25 MMOL/L (ref 21–32)
CO2: 26 MMOL/L (ref 21–32)
CO2: 27 MMOL/L (ref 21–32)
CO2: 30 MMOL/L (ref 21–32)
CO2: 30 MMOL/L (ref 21–32)
CREAT SERPL-MCNC: 0.6 MG/DL (ref 0.9–1.3)
CREAT SERPL-MCNC: 0.7 MG/DL (ref 0.9–1.3)
GFR AFRICAN AMERICAN: >60 ML/MIN/1.73M2
GFR NON-AFRICAN AMERICAN: >60 ML/MIN/1.73M2
GLUCOSE BLD-MCNC: 100 MG/DL (ref 70–99)
GLUCOSE BLD-MCNC: 82 MG/DL (ref 70–99)
GLUCOSE BLD-MCNC: 84 MG/DL (ref 70–99)
GLUCOSE BLD-MCNC: 88 MG/DL (ref 70–99)
GLUCOSE BLD-MCNC: 90 MG/DL (ref 70–99)
GLUCOSE BLD-MCNC: 94 MG/DL (ref 70–99)
LACTATE DEHYDROGENASE: 120 IU/L (ref 120–246)
LACTATE DEHYDROGENASE: 148 IU/L (ref 120–246)
LACTATE DEHYDROGENASE: 165 IU/L (ref 120–246)
LACTATE DEHYDROGENASE: 192 IU/L (ref 120–246)
LACTATE DEHYDROGENASE: 211 IU/L (ref 120–246)
POTASSIUM SERPL-SCNC: 3.5 MMOL/L (ref 3.5–5.1)
POTASSIUM SERPL-SCNC: 3.8 MMOL/L (ref 3.5–5.1)
POTASSIUM SERPL-SCNC: 4 MMOL/L (ref 3.5–5.1)
POTASSIUM SERPL-SCNC: 4.5 MMOL/L (ref 3.5–5.1)
POTASSIUM SERPL-SCNC: 4.7 MMOL/L (ref 3.5–5.1)
POTASSIUM SERPL-SCNC: 4.7 MMOL/L (ref 3.5–5.1)
SODIUM BLD-SCNC: 135 MMOL/L (ref 135–145)
SODIUM BLD-SCNC: 138 MMOL/L (ref 135–145)
SODIUM BLD-SCNC: 139 MMOL/L (ref 135–145)
SODIUM BLD-SCNC: 144 MMOL/L (ref 135–145)
TOTAL PROTEIN: 6.1 GM/DL (ref 6.4–8.2)
TOTAL PROTEIN: 6.1 GM/DL (ref 6.4–8.2)
TOTAL PROTEIN: 6.9 GM/DL (ref 6.4–8.2)
TOTAL PROTEIN: 7.2 GM/DL (ref 6.4–8.2)
TOTAL PROTEIN: 7.2 GM/DL (ref 6.4–8.2)
TOTAL PROTEIN: 7.6 GM/DL (ref 6.4–8.2)

## 2018-01-01 PROCEDURE — 1036F TOBACCO NON-USER: CPT | Performed by: INTERNAL MEDICINE

## 2018-01-01 PROCEDURE — 80053 COMPREHEN METABOLIC PANEL: CPT

## 2018-01-01 PROCEDURE — 3017F COLORECTAL CA SCREEN DOC REV: CPT | Performed by: INTERNAL MEDICINE

## 2018-01-01 PROCEDURE — 99213 OFFICE O/P EST LOW 20 MIN: CPT | Performed by: INTERNAL MEDICINE

## 2018-01-01 PROCEDURE — 83615 LACTATE (LD) (LDH) ENZYME: CPT

## 2018-01-01 PROCEDURE — G8427 DOCREV CUR MEDS BY ELIG CLIN: HCPCS | Performed by: INTERNAL MEDICINE

## 2018-01-01 PROCEDURE — G8419 CALC BMI OUT NRM PARAM NOF/U: HCPCS | Performed by: INTERNAL MEDICINE

## 2018-01-01 RX ORDER — LACTOSE-REDUCED FOOD
LIQUID (ML) ORAL
COMMUNITY
Start: 2018-05-04

## 2018-01-01 RX ORDER — PAZOPANIB HYDROCHLORIDE 200 MG/1
TABLET, FILM COATED ORAL
COMMUNITY
Start: 2018-05-10

## 2018-01-08 ENCOUNTER — HOSPITAL ENCOUNTER (OUTPATIENT)
Dept: CT IMAGING | Age: 63
Discharge: HOME | End: 2018-01-08
Attending: SPECIALIST | Admitting: SPECIALIST

## 2018-01-08 DIAGNOSIS — R63.4 WEIGHT LOSS, NON-INTENTIONAL: ICD-10-CM

## 2018-01-08 DIAGNOSIS — N28.89 URETERAL FISTULA: ICD-10-CM

## 2018-01-08 DIAGNOSIS — N28.89 OTHER SPECIFIED DISORDERS OF KIDNEY AND URETER: ICD-10-CM

## 2018-01-08 DIAGNOSIS — N28.89 RENAL MASS: ICD-10-CM

## 2018-01-08 LAB
GFR AFRICAN AMERICAN: >60 ML/MIN/1.73M2
GFR NON-AFRICAN AMERICAN: >60 ML/MIN/1.73M2
POC CREATININE: 0.8 MG/DL (ref 0.9–1.3)

## 2018-01-08 RX ORDER — TC 99M MEDRONATE 20 MG/10ML
25 INJECTION, POWDER, LYOPHILIZED, FOR SOLUTION INTRAVENOUS
Status: COMPLETED | OUTPATIENT
Start: 2018-01-08 | End: 2018-01-08

## 2018-01-08 RX ADMIN — TC 99M MEDRONATE 25 MILLICURIE: 20 INJECTION, POWDER, LYOPHILIZED, FOR SOLUTION INTRAVENOUS at 11:00

## 2018-01-15 ENCOUNTER — TELEPHONE (OUTPATIENT)
Dept: BARIATRICS/WEIGHT MGMT | Age: 63
End: 2018-01-15

## 2018-01-18 ENCOUNTER — HOSPITAL ENCOUNTER (OUTPATIENT)
Dept: OTHER | Age: 63
Discharge: HOME | End: 2018-01-18
Attending: INTERNAL MEDICINE | Admitting: INTERNAL MEDICINE

## 2018-01-18 LAB
ALBUMIN SERPL-MCNC: 3.4 GM/DL (ref 3.4–5)
ALP BLD-CCNC: 92 IU/L (ref 40–129)
ALT SERPL-CCNC: 17 U/L (ref 10–40)
ANION GAP SERPL CALCULATED.3IONS-SCNC: 12 MMOL/L (ref 4–16)
APTT: 47.3 SECONDS (ref 21.2–33)
AST SERPL-CCNC: 14 IU/L (ref 15–37)
BILIRUB SERPL-MCNC: 0.2 MG/DL (ref 0–1)
BUN BLDV-MCNC: 19 MG/DL (ref 6–23)
CALCIUM SERPL-MCNC: 9.6 MG/DL (ref 8.3–10.6)
CHLORIDE BLD-SCNC: 98 MMOL/L (ref 99–110)
CO2: 29 MMOL/L (ref 21–32)
CREAT SERPL-MCNC: 0.8 MG/DL (ref 0.9–1.3)
GFR AFRICAN AMERICAN: >60 ML/MIN/1.73M2
GFR NON-AFRICAN AMERICAN: >60 ML/MIN/1.73M2
GLUCOSE BLD-MCNC: 97 MG/DL (ref 70–99)
INR BLD: 1.35 INDEX
LACTATE DEHYDROGENASE: 128 IU/L (ref 120–246)
POTASSIUM SERPL-SCNC: 4.5 MMOL/L (ref 3.5–5.1)
PROTHROMBIN TIME: 15.3 SECONDS (ref 9.12–12.5)
SODIUM BLD-SCNC: 139 MMOL/L (ref 135–145)
TOTAL PROTEIN: 7.6 GM/DL (ref 6.4–8.2)

## 2018-01-24 ENCOUNTER — OFFICE VISIT (OUTPATIENT)
Dept: BARIATRICS/WEIGHT MGMT | Age: 63
End: 2018-01-24

## 2018-01-24 VITALS
HEART RATE: 64 BPM | HEIGHT: 74 IN | WEIGHT: 173 LBS | SYSTOLIC BLOOD PRESSURE: 103 MMHG | DIASTOLIC BLOOD PRESSURE: 63 MMHG | BODY MASS INDEX: 22.2 KG/M2

## 2018-01-24 DIAGNOSIS — K56.1 INTUSSUSCEPTION (HCC): ICD-10-CM

## 2018-01-24 DIAGNOSIS — C64.1 RENAL CELL CARCINOMA OF RIGHT KIDNEY (HCC): Primary | ICD-10-CM

## 2018-01-24 PROCEDURE — 99205 OFFICE O/P NEW HI 60 MIN: CPT | Performed by: SURGERY

## 2018-01-24 PROCEDURE — G8420 CALC BMI NORM PARAMETERS: HCPCS | Performed by: SURGERY

## 2018-01-24 PROCEDURE — 3017F COLORECTAL CA SCREEN DOC REV: CPT | Performed by: SURGERY

## 2018-01-24 PROCEDURE — G8484 FLU IMMUNIZE NO ADMIN: HCPCS | Performed by: SURGERY

## 2018-01-24 PROCEDURE — 1036F TOBACCO NON-USER: CPT | Performed by: SURGERY

## 2018-01-24 PROCEDURE — G8427 DOCREV CUR MEDS BY ELIG CLIN: HCPCS | Performed by: SURGERY

## 2018-01-24 ASSESSMENT — ENCOUNTER SYMPTOMS
ABDOMINAL PAIN: 1
CONSTIPATION: 0
TROUBLE SWALLOWING: 0
NAUSEA: 0
DIARRHEA: 0
COUGH: 0
VOMITING: 0
BLOOD IN STOOL: 0
VOICE CHANGE: 0
SHORTNESS OF BREATH: 0
SORE THROAT: 0
ANAL BLEEDING: 0
COLOR CHANGE: 0
PHOTOPHOBIA: 0
WHEEZING: 0

## 2018-01-24 NOTE — PROGRESS NOTES
alternatives of treatment plan at length while in the office today. Patient states an understanding and willingness to proceed with the plan. Follow Up:  Return in about 4 weeks (around 2/21/2018) for Surgery. Narendra Noonan MD, FACS, FICS.     1/24/18

## 2018-01-25 ENCOUNTER — HOSPITAL ENCOUNTER (OUTPATIENT)
Dept: MRI IMAGING | Age: 63
Discharge: HOME | End: 2018-01-25
Attending: INTERNAL MEDICINE | Admitting: INTERNAL MEDICINE

## 2018-01-25 DIAGNOSIS — N28.89 OTHER SPECIFIED DISORDERS OF KIDNEY AND URETER: ICD-10-CM

## 2018-01-25 DIAGNOSIS — C79.52 SECONDARY MALIGNANT NEOPLASM OF BONE AND BONE MARROW (HCC): ICD-10-CM

## 2018-01-25 DIAGNOSIS — C79.51 SECONDARY MALIGNANT NEOPLASM OF BONE AND BONE MARROW (HCC): ICD-10-CM

## 2018-02-19 ENCOUNTER — HOSPITAL ENCOUNTER (OUTPATIENT)
Dept: PREADMISSION TESTING | Age: 63
Discharge: HOME | End: 2018-02-19
Attending: SPECIALIST | Admitting: SPECIALIST

## 2018-02-19 VITALS
WEIGHT: 165 LBS | BODY MASS INDEX: 21.17 KG/M2 | OXYGEN SATURATION: 98 % | HEART RATE: 70 BPM | SYSTOLIC BLOOD PRESSURE: 100 MMHG | TEMPERATURE: 98.3 F | RESPIRATION RATE: 16 BRPM | HEIGHT: 74 IN | DIASTOLIC BLOOD PRESSURE: 67 MMHG

## 2018-02-19 LAB
ALBUMIN SERPL-MCNC: 3.3 GM/DL (ref 3.4–5)
ALBUMIN SERPL-MCNC: 3.3 GM/DL (ref 3.4–5)
ALP BLD-CCNC: 105 IU/L (ref 40–129)
ALT SERPL-CCNC: 16 U/L (ref 10–40)
ANION GAP SERPL CALCULATED.3IONS-SCNC: 14 MMOL/L (ref 4–16)
ANISOCYTOSIS: ABNORMAL
APTT: 51.8 SECONDS (ref 21.2–33)
AST SERPL-CCNC: 14 IU/L (ref 15–37)
BASOPHILS ABSOLUTE: 0 K/CU MM
BASOPHILS RELATIVE PERCENT: 0.3 % (ref 0–1)
BILIRUB SERPL-MCNC: 0.3 MG/DL (ref 0–1)
BILIRUBIN DIRECT: 0.2 MG/DL (ref 0–0.3)
BILIRUBIN, INDIRECT: 0.1 MG/DL (ref 0–0.7)
BUN BLDV-MCNC: 18 MG/DL (ref 6–23)
CALCIUM SERPL-MCNC: 9.9 MG/DL (ref 8.3–10.6)
CHLORIDE BLD-SCNC: 95 MMOL/L (ref 99–110)
CO2: 28 MMOL/L (ref 21–32)
CREAT SERPL-MCNC: 0.9 MG/DL (ref 0.9–1.3)
DIFFERENTIAL TYPE: ABNORMAL
EOSINOPHILS ABSOLUTE: 0 K/CU MM
EOSINOPHILS RELATIVE PERCENT: 0.2 % (ref 0–3)
GFR AFRICAN AMERICAN: >60 ML/MIN/1.73M2
GFR NON-AFRICAN AMERICAN: >60 ML/MIN/1.73M2
GLUCOSE BLD-MCNC: 104 MG/DL (ref 70–99)
HCT VFR BLD CALC: 39.4 % (ref 42–52)
HEMOGLOBIN: 10.8 GM/DL (ref 13.5–18)
HYPOCHROMIA: ABNORMAL
IMMATURE NEUTROPHIL %: 0.7 % (ref 0–0.43)
INR BLD: 1.5 INDEX
LYMPHOCYTES ABSOLUTE: 1.6 K/CU MM
LYMPHOCYTES RELATIVE PERCENT: 12.6 % (ref 24–44)
MCH RBC QN AUTO: 19.4 PG (ref 27–31)
MCHC RBC AUTO-ENTMCNC: 27.4 % (ref 32–36)
MCV RBC AUTO: 70.6 FL (ref 78–100)
MICROCYTES: ABNORMAL
MONOCYTES ABSOLUTE: 1.2 K/CU MM
MONOCYTES RELATIVE PERCENT: 9 % (ref 0–4)
NUCLEATED RBC %: 0 %
PDW BLD-RTO: 19.1 % (ref 11.7–14.9)
PHOSPHORUS: 3.9 MG/DL (ref 2.5–4.9)
PLATELET # BLD: 443 K/CU MM (ref 140–440)
PMV BLD AUTO: 8.7 FL (ref 7.5–11.1)
POLYCHROMASIA: ABNORMAL
POTASSIUM SERPL-SCNC: 4.5 MMOL/L (ref 3.5–5.1)
PROTHROMBIN TIME: 17 SECONDS (ref 9.12–12.5)
RBC # BLD: 5.58 M/CU MM (ref 4.6–6.2)
SEGMENTED NEUTROPHILS ABSOLUTE COUNT: 10 K/CU MM
SEGMENTED NEUTROPHILS RELATIVE PERCENT: 77.2 % (ref 36–66)
SODIUM BLD-SCNC: 137 MMOL/L (ref 135–145)
TOTAL IMMATURE NEUTOROPHIL: 0.09 K/CU MM
TOTAL NUCLEATED RBC: 0 K/CU MM
TOTAL PROTEIN: 7.5 GM/DL (ref 6.4–8.2)
WBC # BLD: 13 K/CU MM (ref 4–10.5)

## 2018-02-19 RX ORDER — ASCORBIC ACID 500 MG
500 TABLET ORAL DAILY
COMMUNITY

## 2018-02-19 RX ORDER — OXYCODONE HYDROCHLORIDE 5 MG/1
5 TABLET ORAL EVERY 4 HOURS PRN
COMMUNITY

## 2018-02-19 RX ORDER — FERROUS SULFATE 325(65) MG
325 TABLET ORAL
COMMUNITY

## 2018-02-19 ASSESSMENT — PAIN DESCRIPTION - PAIN TYPE: TYPE: ACUTE PAIN

## 2018-02-19 ASSESSMENT — PAIN SCALES - GENERAL: PAINLEVEL_OUTOF10: 3

## 2018-02-21 ENCOUNTER — HOSPITAL ENCOUNTER (OUTPATIENT)
Dept: OTHER | Age: 63
Discharge: HOME | End: 2018-02-21
Attending: INTERNAL MEDICINE | Admitting: INTERNAL MEDICINE

## 2018-02-21 LAB
APTT: 58 SECONDS (ref 21.2–33)
FERRITIN: 1200 NG/ML (ref 30–400)
FIBRINOGEN LEVEL: 1066 MG/DL (ref 196.9–442.1)
INR BLD: 1.41 INDEX
IRON: 12 UG/DL (ref 59–158)
PCT TRANSFERRIN: 6 % (ref 10–44)
PROTHROMBIN TIME: 16.3 SECONDS (ref 9.12–12.5)
TOTAL IRON BINDING CAPACITY: 218 UG/DL (ref 250–450)
UNSATURATED IRON BINDING CAPACITY: 206 UG/DL (ref 110–370)

## 2018-02-23 ENCOUNTER — TELEPHONE (OUTPATIENT)
Dept: CARDIOLOGY CLINIC | Age: 63
End: 2018-02-23

## 2018-02-23 LAB
MIXING STUDY, PT: 16.8
PT INHIBITOR SCREEN 1:1 MIX: 14.7
PTT 1:1 MIX: 53
PTT INHIBITOR SCREEN: 71

## 2018-02-23 NOTE — TELEPHONE ENCOUNTER
Sherren Boone from [facility] called to request EKG to be faxed to [facility] for surgery.

## 2018-02-26 ENCOUNTER — HOSPITAL ENCOUNTER (OUTPATIENT)
Dept: OTHER | Age: 63
Discharge: HOME | End: 2018-02-26
Attending: INTERNAL MEDICINE | Admitting: INTERNAL MEDICINE

## 2018-03-01 LAB
DILUTE RUSSELL VIPER VENOM TIME: 43 SEC
DRVVT 1 TO 1 MIX REFLEX ONLY: NORMAL
DRVVT CONFIRMATION TEST: NORMAL

## 2018-03-05 ENCOUNTER — HOSPITAL ENCOUNTER (OUTPATIENT)
Dept: OTHER | Age: 63
Discharge: HOME | End: 2018-03-05
Attending: INTERNAL MEDICINE | Admitting: INTERNAL MEDICINE

## 2018-03-05 LAB
APTT: 57.1 SECONDS (ref 21.2–33)
INR BLD: 1.44 INDEX
PROTHROMBIN TIME: 16.4 SECONDS (ref 9.12–12.5)

## 2018-03-07 ENCOUNTER — HOSPITAL ENCOUNTER (OUTPATIENT)
Dept: OTHER | Age: 63
Discharge: HOME | End: 2018-03-07
Attending: INTERNAL MEDICINE | Admitting: INTERNAL MEDICINE

## 2018-03-07 LAB
ALBUMIN SERPL-MCNC: 3.2 GM/DL (ref 3.4–5)
ALP BLD-CCNC: 94 IU/L (ref 40–129)
ALT SERPL-CCNC: 26 U/L (ref 10–40)
ANION GAP SERPL CALCULATED.3IONS-SCNC: 13 MMOL/L (ref 4–16)
AST SERPL-CCNC: 22 IU/L (ref 15–37)
BILIRUB SERPL-MCNC: 0.2 MG/DL (ref 0–1)
BUN BLDV-MCNC: 10 MG/DL (ref 6–23)
CALCIUM SERPL-MCNC: 9.7 MG/DL (ref 8.3–10.6)
CHLORIDE BLD-SCNC: 94 MMOL/L (ref 99–110)
CO2: 30 MMOL/L (ref 21–32)
CREAT SERPL-MCNC: 0.8 MG/DL (ref 0.9–1.3)
FERRITIN: 1005 NG/ML (ref 30–400)
GFR AFRICAN AMERICAN: >60 ML/MIN/1.73M2
GFR NON-AFRICAN AMERICAN: >60 ML/MIN/1.73M2
GLUCOSE BLD-MCNC: 87 MG/DL (ref 70–99)
POTASSIUM SERPL-SCNC: 4.4 MMOL/L (ref 3.5–5.1)
SODIUM BLD-SCNC: 137 MMOL/L (ref 135–145)
TOTAL PROTEIN: 7.2 GM/DL (ref 6.4–8.2)
VITAMIN B-12: 943.8 PG/ML (ref 211–911)

## 2018-03-10 LAB
ANTICARDIOLIPIN IGA ANTIBODY: 6
ANTICARDIOLIPIN IGG ANTIBODY: 3
CARDIOLIPIN AB IGM: 11
FACTOR VIII ACTIVITY: 531 % (ref 50–150)
RISTOCETIN CO-FACTOR: 282 % (ref 65–140)
VON WILLEBRAND AG: 332 % (ref 50–150)

## 2018-03-11 LAB
BETA 2 GLYCOPROT.1 IGA AB: 4 SMU (ref 0–20)
BETA 2 GLYCOPROT.1 IGM AB: 3 SAU (ref 0–20)
BETA-2 GLYCOPROTEIN 1 IGG ANTIBODY: 0 SGU (ref 0–20)

## 2018-03-15 LAB
Lab: NORMAL
TEST NAME: NORMAL

## 2018-04-02 ENCOUNTER — HOSPITAL ENCOUNTER (OUTPATIENT)
Dept: OTHER | Age: 63
Discharge: HOME | End: 2018-04-02
Attending: INTERNAL MEDICINE | Admitting: INTERNAL MEDICINE

## 2018-04-02 LAB
ALBUMIN SERPL-MCNC: 3.7 GM/DL (ref 3.4–5)
ALP BLD-CCNC: 110 IU/L (ref 40–128)
ALT SERPL-CCNC: 33 U/L (ref 10–40)
ANION GAP SERPL CALCULATED.3IONS-SCNC: 17 MMOL/L (ref 4–16)
AST SERPL-CCNC: 36 IU/L (ref 15–37)
BILIRUB SERPL-MCNC: 0.4 MG/DL (ref 0–1)
BUN BLDV-MCNC: 12 MG/DL (ref 6–23)
CALCIUM SERPL-MCNC: 9.9 MG/DL (ref 8.3–10.6)
CHLORIDE BLD-SCNC: 93 MMOL/L (ref 99–110)
CO2: 26 MMOL/L (ref 21–32)
CREAT SERPL-MCNC: 0.8 MG/DL (ref 0.9–1.3)
GFR AFRICAN AMERICAN: >60 ML/MIN/1.73M2
GFR NON-AFRICAN AMERICAN: >60 ML/MIN/1.73M2
GLUCOSE BLD-MCNC: 112 MG/DL (ref 70–99)
POTASSIUM SERPL-SCNC: 5.6 MMOL/L (ref 3.5–5.1)
SODIUM BLD-SCNC: 136 MMOL/L (ref 135–145)
TOTAL PROTEIN: 8.9 GM/DL (ref 6.4–8.2)

## 2018-04-05 LAB
BETA 2 GLYCOPROT.1 IGA AB: 6 SMU (ref 0–20)
BETA 2 GLYCOPROT.1 IGM AB: 4 SAU (ref 0–20)
BETA-2 GLYCOPROTEIN 1 IGG ANTIBODY: 0 SGU (ref 0–20)
HEMOGLOBIN A/HEMOGLOBIN TOTAL: 97.5
HEMOGLOBIN A2/HEMOGLOBIN TOTAL: 2.3
HEMOGLOBIN C/HEMOGLOBIN TOTAL: 0
HEMOGLOBIN E QUANTITATION: 0
HEMOGLOBIN ELECTROPHORESIS: NORMAL
HEMOGLOBIN F/HEMOGLOBIN TOTAL: 0.2
HEMOGLOBIN OTHER: 0
HEMOGLOBIN S/HEMOGLOBIN TOTAL: 0
HGB INTERPRETATION: NORMAL

## 2018-04-06 LAB — VITAMIN K: 0.51

## 2018-04-23 ENCOUNTER — HOSPITAL ENCOUNTER (OUTPATIENT)
Dept: OTHER | Age: 63
Discharge: HOME | End: 2018-04-23
Attending: INTERNAL MEDICINE | Admitting: INTERNAL MEDICINE

## 2018-04-23 LAB
ALBUMIN SERPL-MCNC: 3.6 GM/DL (ref 3.4–5)
ALP BLD-CCNC: 108 IU/L (ref 40–129)
ALT SERPL-CCNC: 35 U/L (ref 10–40)
ANION GAP SERPL CALCULATED.3IONS-SCNC: 13 MMOL/L (ref 4–16)
AST SERPL-CCNC: 33 IU/L (ref 15–37)
BILIRUB SERPL-MCNC: 0.3 MG/DL (ref 0–1)
BUN BLDV-MCNC: 12 MG/DL (ref 6–23)
CALCIUM SERPL-MCNC: 9.5 MG/DL (ref 8.3–10.6)
CHLORIDE BLD-SCNC: 98 MMOL/L (ref 99–110)
CO2: 29 MMOL/L (ref 21–32)
CREAT SERPL-MCNC: 0.8 MG/DL (ref 0.9–1.3)
GFR AFRICAN AMERICAN: >60 ML/MIN/1.73M2
GFR NON-AFRICAN AMERICAN: >60 ML/MIN/1.73M2
GLUCOSE BLD-MCNC: 107 MG/DL (ref 70–99)
POTASSIUM SERPL-SCNC: 4.8 MMOL/L (ref 3.5–5.1)
SODIUM BLD-SCNC: 140 MMOL/L (ref 135–145)
TOTAL PROTEIN: 8.1 GM/DL (ref 6.4–8.2)

## 2018-04-25 LAB
ALBUMIN ELP: 3.1 GM/DL (ref 3.2–5.6)
ALPHA-1-GLOBULIN: 0.6 GM/DL (ref 0.1–0.4)
ALPHA-2-GLOBULIN: 1.1 GM/DL (ref 0.4–1.2)
BETA GLOBULIN: 1.3 GM/DL (ref 0.5–1.3)
GAMMA GLOBULIN: 2 GM/DL (ref 0.5–1.6)
TOTAL PROTEIN: 8.1 GM/DL (ref 6.4–8.2)

## 2018-05-14 ENCOUNTER — HOSPITAL ENCOUNTER (OUTPATIENT)
Dept: GENERAL RADIOLOGY | Age: 63
Discharge: HOME | End: 2018-05-14
Attending: INTERNAL MEDICINE | Admitting: INTERNAL MEDICINE

## 2018-05-14 DIAGNOSIS — C64.1 MALIGNANT NEOPLASM OF RIGHT KIDNEY, EXCEPT RENAL PELVIS (HCC): ICD-10-CM

## 2018-05-14 LAB
ALBUMIN SERPL-MCNC: 3.3 GM/DL (ref 3.4–5)
ALP BLD-CCNC: 94 IU/L (ref 40–129)
ALT SERPL-CCNC: 22 U/L (ref 10–40)
ANION GAP SERPL CALCULATED.3IONS-SCNC: 12 MMOL/L (ref 4–16)
AST SERPL-CCNC: 25 IU/L (ref 15–37)
ATYPICAL LYMPHOCYTE ABSOLUTE COUNT: ABNORMAL
BANDED NEUTROPHILS ABSOLUTE COUNT: 0.13 K/CU MM
BANDED NEUTROPHILS RELATIVE PERCENT: 2 % (ref 5–11)
BILIRUB SERPL-MCNC: 0.3 MG/DL (ref 0–1)
BUN BLDV-MCNC: 14 MG/DL (ref 6–23)
CALCIUM SERPL-MCNC: 8.8 MG/DL (ref 8.3–10.6)
CHLORIDE BLD-SCNC: 99 MMOL/L (ref 99–110)
CO2: 27 MMOL/L (ref 21–32)
CREAT SERPL-MCNC: 0.7 MG/DL (ref 0.9–1.3)
DIFFERENTIAL TYPE: ABNORMAL
EOSINOPHILS ABSOLUTE: 0.1 K/CU MM
EOSINOPHILS RELATIVE PERCENT: 2 % (ref 0–3)
GFR AFRICAN AMERICAN: >60 ML/MIN/1.73M2
GFR NON-AFRICAN AMERICAN: >60 ML/MIN/1.73M2
GLUCOSE FASTING: 88 MG/DL (ref 70–99)
HCT VFR BLD CALC: 42.7 % (ref 42–52)
HEMOGLOBIN: 12.6 GM/DL (ref 13.5–18)
LACTATE DEHYDROGENASE: 176 IU/L (ref 120–246)
LYMPHOCYTES ABSOLUTE: 3.4 K/CU MM
LYMPHOCYTES RELATIVE PERCENT: 53 % (ref 24–44)
MCH RBC QN AUTO: 23.4 PG (ref 27–31)
MCHC RBC AUTO-ENTMCNC: 29.5 % (ref 32–36)
MCV RBC AUTO: 79.4 FL (ref 78–100)
MONOCYTES ABSOLUTE: 0.1 K/CU MM
MONOCYTES RELATIVE PERCENT: 1 % (ref 0–4)
PLATELET # BLD: 155 K/CU MM (ref 140–440)
PMV BLD AUTO: 10 FL (ref 7.5–11.1)
POTASSIUM SERPL-SCNC: 4.6 MMOL/L (ref 3.5–5.1)
RBC # BLD: 5.38 M/CU MM (ref 4.6–6.2)
RBC # BLD: ABNORMAL 10*6/UL
SEGMENTED NEUTROPHILS ABSOLUTE COUNT: 2.7 K/CU MM
SEGMENTED NEUTROPHILS RELATIVE PERCENT: 42 % (ref 36–66)
SODIUM BLD-SCNC: 138 MMOL/L (ref 135–145)
TOTAL PROTEIN: 7.2 GM/DL (ref 6.4–8.2)
WBC # BLD: 6.4 K/CU MM (ref 4–10.5)

## 2019-01-01 ENCOUNTER — APPOINTMENT (OUTPATIENT)
Dept: CT IMAGING | Age: 64
DRG: 461 | End: 2019-01-01
Payer: COMMERCIAL

## 2019-01-01 ENCOUNTER — APPOINTMENT (OUTPATIENT)
Dept: GENERAL RADIOLOGY | Age: 64
DRG: 461 | End: 2019-01-01
Payer: COMMERCIAL

## 2019-01-01 ENCOUNTER — HOSPITAL ENCOUNTER (OUTPATIENT)
Age: 64
Setting detail: SPECIMEN
Discharge: HOME | End: 2019-05-13
Payer: COMMERCIAL

## 2019-01-01 ENCOUNTER — HOSPITAL ENCOUNTER (INPATIENT)
Age: 64
LOS: 6 days | DRG: 686 | End: 2019-05-27
Attending: FAMILY MEDICINE | Admitting: FAMILY MEDICINE
Payer: COMMERCIAL

## 2019-01-01 ENCOUNTER — HOSPITAL ENCOUNTER (OUTPATIENT)
Dept: CT IMAGING | Age: 64
Discharge: HOME | End: 2019-01-31
Payer: COMMERCIAL

## 2019-01-01 ENCOUNTER — HOSPITAL ENCOUNTER (OUTPATIENT)
Age: 64
Setting detail: SPECIMEN
Discharge: HOME | End: 2019-04-01
Payer: COMMERCIAL

## 2019-01-01 ENCOUNTER — HOSPITAL ENCOUNTER (OUTPATIENT)
Age: 64
Setting detail: SPECIMEN
Discharge: HOME | End: 2019-04-18
Payer: COMMERCIAL

## 2019-01-01 ENCOUNTER — HOSPITAL ENCOUNTER (OUTPATIENT)
Age: 64
Setting detail: SPECIMEN
Discharge: HOME | End: 2019-05-08
Payer: COMMERCIAL

## 2019-01-01 ENCOUNTER — HOSPITAL ENCOUNTER (OUTPATIENT)
Age: 64
Setting detail: SPECIMEN
Discharge: HOME | End: 2019-04-24
Payer: COMMERCIAL

## 2019-01-01 ENCOUNTER — HOSPITAL ENCOUNTER (OUTPATIENT)
Dept: CT IMAGING | Age: 64
Discharge: HOME | End: 2019-04-17
Payer: COMMERCIAL

## 2019-01-01 ENCOUNTER — HOSPITAL ENCOUNTER (OUTPATIENT)
Age: 64
Setting detail: SPECIMEN
Discharge: HOME | End: 2019-03-20
Payer: COMMERCIAL

## 2019-01-01 ENCOUNTER — HOSPITAL ENCOUNTER (OUTPATIENT)
Age: 64
Setting detail: SPECIMEN
Discharge: HOME | End: 2019-02-04
Payer: COMMERCIAL

## 2019-01-01 ENCOUNTER — HOSPITAL ENCOUNTER (INPATIENT)
Age: 64
LOS: 1 days | Discharge: HOSPICE | DRG: 461 | End: 2019-05-21
Attending: EMERGENCY MEDICINE | Admitting: HOSPITALIST
Payer: COMMERCIAL

## 2019-01-01 ENCOUNTER — HOSPITAL ENCOUNTER (OUTPATIENT)
Dept: MRI IMAGING | Age: 64
Discharge: HOME | End: 2019-05-14
Payer: COMMERCIAL

## 2019-01-01 VITALS
DIASTOLIC BLOOD PRESSURE: 60 MMHG | BODY MASS INDEX: 16.05 KG/M2 | TEMPERATURE: 99.8 F | RESPIRATION RATE: 23 BRPM | HEIGHT: 74 IN | WEIGHT: 125.1 LBS | HEART RATE: 131 BPM | OXYGEN SATURATION: 90 % | SYSTOLIC BLOOD PRESSURE: 110 MMHG

## 2019-01-01 VITALS
TEMPERATURE: 97.8 F | HEART RATE: 80 BPM | DIASTOLIC BLOOD PRESSURE: 79 MMHG | HEIGHT: 74 IN | RESPIRATION RATE: 16 BRPM | WEIGHT: 137.4 LBS | BODY MASS INDEX: 17.63 KG/M2 | OXYGEN SATURATION: 96 % | SYSTOLIC BLOOD PRESSURE: 131 MMHG

## 2019-01-01 DIAGNOSIS — D72.829 LEUKOCYTOSIS, UNSPECIFIED TYPE: ICD-10-CM

## 2019-01-01 DIAGNOSIS — R53.83 FATIGUE, UNSPECIFIED TYPE: ICD-10-CM
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
## 2019-01-01 DIAGNOSIS — S22.009A CLOSED FRACTURE OF TRANSVERSE PROCESS OF THORACIC VERTEBRA, INITIAL ENCOUNTER (HCC): ICD-10-CM

## 2019-01-01 DIAGNOSIS — C64.1 MALIGNANT NEOPLASM OF RIGHT KIDNEY, EXCEPT RENAL PELVIS (HCC): ICD-10-CM

## 2019-01-01 DIAGNOSIS — Z85.9 HISTORY OF CANCER: ICD-10-CM

## 2019-01-01 LAB
ALBUMIN SERPL-MCNC: 1.7 GM/DL (ref 3.4–5)
ALBUMIN SERPL-MCNC: 2 GM/DL (ref 3.4–5)
ALBUMIN SERPL-MCNC: 2.2 GM/DL (ref 3.4–5)
ALBUMIN SERPL-MCNC: 2.5 GM/DL (ref 3.4–5)
ALBUMIN SERPL-MCNC: 3 GM/DL (ref 3.4–5)
ALBUMIN SERPL-MCNC: 3.1 GM/DL (ref 3.4–5)
ALBUMIN SERPL-MCNC: 3.2 GM/DL (ref 3.4–5)
ALBUMIN SERPL-MCNC: 3.3 GM/DL (ref 3.4–5)
ALP BLD-CCNC: 109 IU/L (ref 40–128)
ALP BLD-CCNC: 109 IU/L (ref 40–129)
ALP BLD-CCNC: 110 IU/L (ref 40–128)
ALP BLD-CCNC: 117 IU/L (ref 40–129)
ALP BLD-CCNC: 132 IU/L (ref 40–129)
ALP BLD-CCNC: 179 IU/L (ref 40–128)
ALP BLD-CCNC: 95 IU/L (ref 40–129)
ALP BLD-CCNC: 96 IU/L (ref 40–128)
ALT SERPL-CCNC: 10 U/L (ref 10–40)
ALT SERPL-CCNC: 11 U/L (ref 10–40)
ALT SERPL-CCNC: 13 U/L (ref 10–40)
ALT SERPL-CCNC: 15 U/L (ref 10–40)
ALT SERPL-CCNC: 16 U/L (ref 10–40)
ALT SERPL-CCNC: 20 U/L (ref 10–40)
ALT SERPL-CCNC: 30 U/L (ref 10–40)
ALT SERPL-CCNC: 49 U/L (ref 10–40)
ANION GAP SERPL CALCULATED.3IONS-SCNC: 10 MMOL/L (ref 4–16)
ANION GAP SERPL CALCULATED.3IONS-SCNC: 11 MMOL/L (ref 4–16)
ANION GAP SERPL CALCULATED.3IONS-SCNC: 11 MMOL/L (ref 4–16)
ANION GAP SERPL CALCULATED.3IONS-SCNC: 13 MMOL/L (ref 4–16)
ANION GAP SERPL CALCULATED.3IONS-SCNC: 13 MMOL/L (ref 4–16)
ANION GAP SERPL CALCULATED.3IONS-SCNC: 14 MMOL/L (ref 4–16)
ANION GAP SERPL CALCULATED.3IONS-SCNC: 14 MMOL/L (ref 4–16)
ANION GAP SERPL CALCULATED.3IONS-SCNC: 18 MMOL/L (ref 4–16)
ANISOCYTOSIS: ABNORMAL
APTT: 40 SECONDS (ref 21.2–33)
APTT: 41.8 SECONDS (ref 21.2–33)
APTT: 50.4 SECONDS (ref 21.2–33)
AST SERPL-CCNC: 14 IU/L (ref 15–37)
AST SERPL-CCNC: 15 IU/L (ref 15–37)
AST SERPL-CCNC: 15 IU/L (ref 15–37)
AST SERPL-CCNC: 16 IU/L (ref 15–37)
AST SERPL-CCNC: 24 IU/L (ref 15–37)
AST SERPL-CCNC: 42 IU/L (ref 15–37)
AST SERPL-CCNC: 8 IU/L (ref 15–37)
AST SERPL-CCNC: 9 IU/L (ref 15–37)
BANDED NEUTROPHILS ABSOLUTE COUNT: 0.33 K/CU MM
BANDED NEUTROPHILS ABSOLUTE COUNT: 0.77 K/CU MM
BANDED NEUTROPHILS RELATIVE PERCENT: 2 % (ref 5–11)
BANDED NEUTROPHILS RELATIVE PERCENT: 4 % (ref 5–11)
BASOPHILS ABSOLUTE: 0 K/CU MM
BASOPHILS RELATIVE PERCENT: 0.1 % (ref 0–1)
BILIRUB SERPL-MCNC: 0.2 MG/DL (ref 0–1)
BILIRUB SERPL-MCNC: 0.3 MG/DL (ref 0–1)
BILIRUB SERPL-MCNC: 0.4 MG/DL (ref 0–1)
BUN BLDV-MCNC: 11 MG/DL (ref 6–23)
BUN BLDV-MCNC: 14 MG/DL (ref 6–23)
BUN BLDV-MCNC: 15 MG/DL (ref 6–23)
BUN BLDV-MCNC: 15 MG/DL (ref 6–23)
BUN BLDV-MCNC: 16 MG/DL (ref 6–23)
BUN BLDV-MCNC: 18 MG/DL (ref 6–23)
BUN BLDV-MCNC: 9 MG/DL (ref 6–23)
BUN BLDV-MCNC: 9 MG/DL (ref 6–23)
CALCIUM SERPL-MCNC: 10.3 MG/DL (ref 8.3–10.6)
CALCIUM SERPL-MCNC: 10.4 MG/DL (ref 8.3–10.6)
CALCIUM SERPL-MCNC: 8.7 MG/DL (ref 8.3–10.6)
CALCIUM SERPL-MCNC: 8.9 MG/DL (ref 8.3–10.6)
CALCIUM SERPL-MCNC: 9.3 MG/DL (ref 8.3–10.6)
CALCIUM SERPL-MCNC: 9.4 MG/DL (ref 8.3–10.6)
CALCIUM SERPL-MCNC: 9.5 MG/DL (ref 8.3–10.6)
CALCIUM SERPL-MCNC: 9.7 MG/DL (ref 8.3–10.6)
CHLORIDE BLD-SCNC: 94 MMOL/L (ref 99–110)
CHLORIDE BLD-SCNC: 95 MMOL/L (ref 99–110)
CHLORIDE BLD-SCNC: 96 MMOL/L (ref 99–110)
CHLORIDE BLD-SCNC: 97 MMOL/L (ref 99–110)
CHLORIDE BLD-SCNC: 98 MMOL/L (ref 99–110)
CO2: 21 MMOL/L (ref 21–32)
CO2: 27 MMOL/L (ref 21–32)
CO2: 27 MMOL/L (ref 21–32)
CO2: 28 MMOL/L (ref 21–32)
CO2: 29 MMOL/L (ref 21–32)
CO2: 30 MMOL/L (ref 21–32)
CO2: 30 MMOL/L (ref 21–32)
CO2: 31 MMOL/L (ref 21–32)
CREAT SERPL-MCNC: 0.4 MG/DL (ref 0.9–1.3)
CREAT SERPL-MCNC: 0.4 MG/DL (ref 0.9–1.3)
CREAT SERPL-MCNC: 0.5 MG/DL (ref 0.9–1.3)
CREAT SERPL-MCNC: 0.6 MG/DL (ref 0.9–1.3)
DIFFERENTIAL TYPE: ABNORMAL
EOSINOPHILS ABSOLUTE: 0 K/CU MM
EOSINOPHILS RELATIVE PERCENT: 0 % (ref 0–3)
GFR AFRICAN AMERICAN: >60 ML/MIN/1.73M2
GFR NON-AFRICAN AMERICAN: >60 ML/MIN/1.73M2
GLUCOSE BLD-MCNC: 104 MG/DL (ref 70–99)
GLUCOSE BLD-MCNC: 106 MG/DL (ref 70–99)
GLUCOSE BLD-MCNC: 153 MG/DL (ref 70–99)
GLUCOSE BLD-MCNC: 75 MG/DL (ref 70–99)
GLUCOSE BLD-MCNC: 86 MG/DL (ref 70–99)
GLUCOSE BLD-MCNC: 96 MG/DL (ref 70–99)
HCT VFR BLD CALC: 34.7 % (ref 42–52)
HCT VFR BLD CALC: 36.8 % (ref 42–52)
HCT VFR BLD CALC: 37.9 % (ref 42–52)
HCT VFR BLD CALC: 38 % (ref 42–52)
HCT VFR BLD CALC: 41.4 % (ref 42–52)
HEMOGLOBIN: 10.2 GM/DL (ref 13.5–18)
HEMOGLOBIN: 10.3 GM/DL (ref 13.5–18)
HEMOGLOBIN: 10.3 GM/DL (ref 13.5–18)
HEMOGLOBIN: 11.3 GM/DL (ref 13.5–18)
HEMOGLOBIN: 9.4 GM/DL (ref 13.5–18)
IMMATURE NEUTROPHIL %: 0.9 % (ref 0–0.43)
IMMATURE NEUTROPHIL %: 0.9 % (ref 0–0.43)
IMMATURE NEUTROPHIL %: 1 % (ref 0–0.43)
INR BLD: 1.07 INDEX
INR BLD: 1.44 INDEX
LACTATE DEHYDROGENASE: 128 IU/L (ref 120–246)
LACTATE DEHYDROGENASE: 190 IU/L (ref 120–246)
LACTATE DEHYDROGENASE: 209 IU/L (ref 120–246)
LIPASE: 9 IU/L (ref 13–60)
LYMPHOCYTES ABSOLUTE: 1 K/CU MM
LYMPHOCYTES ABSOLUTE: 1.2 K/CU MM
LYMPHOCYTES ABSOLUTE: 1.2 K/CU MM
LYMPHOCYTES ABSOLUTE: 1.5 K/CU MM
LYMPHOCYTES ABSOLUTE: 1.8 K/CU MM
LYMPHOCYTES RELATIVE PERCENT: 11 % (ref 24–44)
LYMPHOCYTES RELATIVE PERCENT: 6 % (ref 24–44)
LYMPHOCYTES RELATIVE PERCENT: 7.8 % (ref 24–44)
LYMPHOCYTES RELATIVE PERCENT: 8 % (ref 24–44)
LYMPHOCYTES RELATIVE PERCENT: 9 % (ref 24–44)
MAGNESIUM: 1.9 MG/DL (ref 1.8–2.4)
MAGNESIUM: 2 MG/DL (ref 1.8–2.4)
MCH RBC QN AUTO: 23.5 PG (ref 27–31)
MCH RBC QN AUTO: 23.8 PG (ref 27–31)
MCH RBC QN AUTO: 23.9 PG (ref 27–31)
MCH RBC QN AUTO: 23.9 PG (ref 27–31)
MCH RBC QN AUTO: 24.1 PG (ref 27–31)
MCHC RBC AUTO-ENTMCNC: 27.1 % (ref 32–36)
MCHC RBC AUTO-ENTMCNC: 27.1 % (ref 32–36)
MCHC RBC AUTO-ENTMCNC: 27.2 % (ref 32–36)
MCHC RBC AUTO-ENTMCNC: 27.3 % (ref 32–36)
MCHC RBC AUTO-ENTMCNC: 27.7 % (ref 32–36)
MCV RBC AUTO: 86 FL (ref 78–100)
MCV RBC AUTO: 86.8 FL (ref 78–100)
MCV RBC AUTO: 87.9 FL (ref 78–100)
MCV RBC AUTO: 88.2 FL (ref 78–100)
MCV RBC AUTO: 88.3 FL (ref 78–100)
METAMYELOCYTES ABSOLUTE COUNT: 0.17 K/CU MM
METAMYELOCYTES PERCENT: 1 %
MONOCYTES ABSOLUTE: 1 K/CU MM
MONOCYTES ABSOLUTE: 1.2 K/CU MM
MONOCYTES ABSOLUTE: 1.2 K/CU MM
MONOCYTES ABSOLUTE: 1.4 K/CU MM
MONOCYTES ABSOLUTE: 1.5 K/CU MM
MONOCYTES RELATIVE PERCENT: 6 % (ref 0–4)
MONOCYTES RELATIVE PERCENT: 6 % (ref 0–4)
MONOCYTES RELATIVE PERCENT: 8.7 % (ref 0–4)
MONOCYTES RELATIVE PERCENT: 9.1 % (ref 0–4)
MONOCYTES RELATIVE PERCENT: 9.3 % (ref 0–4)
MYELOCYTE PERCENT: 1 %
MYELOCYTES ABSOLUTE COUNT: 0.19 K/CU MM
NUCLEATED RBC %: 0 %
NUCLEATED RBC %: 0 %
PDW BLD-RTO: 19.3 % (ref 11.7–14.9)
PDW BLD-RTO: 19.3 % (ref 11.7–14.9)
PDW BLD-RTO: 19.4 % (ref 11.7–14.9)
PDW BLD-RTO: 19.5 % (ref 11.7–14.9)
PDW BLD-RTO: 19.9 % (ref 11.7–14.9)
PLATELET # BLD: 316 K/CU MM (ref 140–440)
PLATELET # BLD: 360 K/CU MM (ref 140–440)
PLATELET # BLD: 371 K/CU MM (ref 140–440)
PLATELET # BLD: 381 K/CU MM (ref 140–440)
PLATELET # BLD: 464 K/CU MM (ref 140–440)
PMV BLD AUTO: 8.8 FL (ref 7.5–11.1)
PMV BLD AUTO: 8.9 FL (ref 7.5–11.1)
PMV BLD AUTO: 9.1 FL (ref 7.5–11.1)
POLYCHROMASIA: ABNORMAL
POLYCHROMASIA: ABNORMAL
POTASSIUM SERPL-SCNC: 3.7 MMOL/L (ref 3.5–5.1)
POTASSIUM SERPL-SCNC: 3.8 MMOL/L (ref 3.5–5.1)
POTASSIUM SERPL-SCNC: 3.9 MMOL/L (ref 3.5–5.1)
POTASSIUM SERPL-SCNC: 3.9 MMOL/L (ref 3.5–5.1)
POTASSIUM SERPL-SCNC: 4.1 MMOL/L (ref 3.5–5.1)
POTASSIUM SERPL-SCNC: 4.3 MMOL/L (ref 3.5–5.1)
POTASSIUM SERPL-SCNC: 4.3 MMOL/L (ref 3.5–5.1)
POTASSIUM SERPL-SCNC: 4.5 MMOL/L (ref 3.5–5.1)
PRO-BNP: 1275 PG/ML
PROTHROMBIN TIME: 12.4 SECONDS (ref 9.12–12.5)
PROTHROMBIN TIME: 16.6 SECONDS (ref 9.12–12.5)
RBC # BLD: 4 M/CU MM (ref 4.6–6.2)
RBC # BLD: 4.28 M/CU MM (ref 4.6–6.2)
RBC # BLD: 4.31 M/CU MM (ref 4.6–6.2)
RBC # BLD: 4.31 M/CU MM (ref 4.6–6.2)
RBC # BLD: 4.69 M/CU MM (ref 4.6–6.2)
RBC # BLD: ABNORMAL 10*6/UL
SEGMENTED NEUTROPHILS ABSOLUTE COUNT: 10.8 K/CU MM
SEGMENTED NEUTROPHILS ABSOLUTE COUNT: 12.5 K/CU MM
SEGMENTED NEUTROPHILS ABSOLUTE COUNT: 13.3 K/CU MM
SEGMENTED NEUTROPHILS ABSOLUTE COUNT: 14.6 K/CU MM
SEGMENTED NEUTROPHILS ABSOLUTE COUNT: 15.5 K/CU MM
SEGMENTED NEUTROPHILS RELATIVE PERCENT: 80 % (ref 36–66)
SEGMENTED NEUTROPHILS RELATIVE PERCENT: 80.7 % (ref 36–66)
SEGMENTED NEUTROPHILS RELATIVE PERCENT: 81 % (ref 36–66)
SEGMENTED NEUTROPHILS RELATIVE PERCENT: 82 % (ref 36–66)
SEGMENTED NEUTROPHILS RELATIVE PERCENT: 84.3 % (ref 36–66)
SODIUM BLD-SCNC: 134 MMOL/L (ref 135–145)
SODIUM BLD-SCNC: 136 MMOL/L (ref 135–145)
SODIUM BLD-SCNC: 136 MMOL/L (ref 135–145)
SODIUM BLD-SCNC: 137 MMOL/L (ref 135–145)
SODIUM BLD-SCNC: 138 MMOL/L (ref 135–145)
SODIUM BLD-SCNC: 138 MMOL/L (ref 135–145)
SODIUM BLD-SCNC: 139 MMOL/L (ref 135–145)
SODIUM BLD-SCNC: 140 MMOL/L (ref 135–145)
TOTAL CK: 45 IU/L (ref 38–174)
TOTAL IMMATURE NEUTOROPHIL: 0.12 K/CU MM
TOTAL IMMATURE NEUTOROPHIL: 0.15 K/CU MM
TOTAL IMMATURE NEUTOROPHIL: 0.15 K/CU MM
TOTAL NUCLEATED RBC: 0 K/CU MM
TOTAL NUCLEATED RBC: 0 K/CU MM
TOTAL PROTEIN: 4.7 GM/DL (ref 6.4–8.2)
TOTAL PROTEIN: 5.2 GM/DL (ref 6.4–8.2)
TOTAL PROTEIN: 5.6 GM/DL (ref 6.4–8.2)
TOTAL PROTEIN: 6 GM/DL (ref 6.4–8.2)
TOTAL PROTEIN: 6.3 GM/DL (ref 6.4–8.2)
TOTAL PROTEIN: 6.4 GM/DL (ref 6.4–8.2)
TOTAL PROTEIN: 6.4 GM/DL (ref 6.4–8.2)
TOTAL PROTEIN: 6.7 GM/DL (ref 6.4–8.2)
TROPONIN T: <0.01 NG/ML
TSH HIGH SENSITIVITY: 2.75 UIU/ML (ref 0.27–4.2)
WBC # BLD: 13.4 K/CU MM (ref 4–10.5)
WBC # BLD: 15.2 K/CU MM (ref 4–10.5)
WBC # BLD: 16.6 K/CU MM (ref 4–10.5)
WBC # BLD: 17.2 K/CU MM (ref 4–10.5)
WBC # BLD: 19.2 K/CU MM (ref 4–10.5)

## 2019-01-01 PROCEDURE — 6370000000 HC RX 637 (ALT 250 FOR IP): Performed by: NURSE PRACTITIONER

## 2019-01-01 PROCEDURE — 6370000000 HC RX 637 (ALT 250 FOR IP): Performed by: HOSPITALIST

## 2019-01-01 PROCEDURE — 99253 IP/OBS CNSLTJ NEW/EST LOW 45: CPT | Performed by: FAMILY MEDICINE

## 2019-01-01 PROCEDURE — 6360000002 HC RX W HCPCS: Performed by: FAMILY MEDICINE

## 2019-01-01 PROCEDURE — 84443 ASSAY THYROID STIM HORMONE: CPT

## 2019-01-01 PROCEDURE — 2580000003 HC RX 258: Performed by: FAMILY MEDICINE

## 2019-01-01 PROCEDURE — 6370000000 HC RX 637 (ALT 250 FOR IP): Performed by: FAMILY MEDICINE

## 2019-01-01 PROCEDURE — 85007 BL SMEAR W/DIFF WBC COUNT: CPT

## 2019-01-01 PROCEDURE — 99285 EMERGENCY DEPT VISIT HI MDM: CPT

## 2019-01-01 PROCEDURE — 96360 HYDRATION IV INFUSION INIT: CPT

## 2019-01-01 PROCEDURE — 72170 X-RAY EXAM OF PELVIS: CPT

## 2019-01-01 PROCEDURE — 36415 COLL VENOUS BLD VENIPUNCTURE: CPT

## 2019-01-01 PROCEDURE — A9579 GAD-BASE MR CONTRAST NOS,1ML: HCPCS | Performed by: INTERNAL MEDICINE

## 2019-01-01 PROCEDURE — 2580000003 HC RX 258: Performed by: EMERGENCY MEDICINE

## 2019-01-01 PROCEDURE — 6360000004 HC RX CONTRAST MEDICATION: Performed by: INTERNAL MEDICINE

## 2019-01-01 PROCEDURE — 83735 ASSAY OF MAGNESIUM: CPT

## 2019-01-01 PROCEDURE — 6360000002 HC RX W HCPCS: Performed by: HOSPITALIST

## 2019-01-01 PROCEDURE — 94150 VITAL CAPACITY TEST: CPT

## 2019-01-01 PROCEDURE — G0378 HOSPITAL OBSERVATION PER HR: HCPCS

## 2019-01-01 PROCEDURE — 71045 X-RAY EXAM CHEST 1 VIEW: CPT

## 2019-01-01 PROCEDURE — 99231 SBSQ HOSP IP/OBS SF/LOW 25: CPT | Performed by: FAMILY MEDICINE

## 2019-01-01 PROCEDURE — 1250000000 HC SEMI PRIVATE HOSPICE R&B

## 2019-01-01 PROCEDURE — 85025 COMPLETE CBC W/AUTO DIFF WBC: CPT

## 2019-01-01 PROCEDURE — 83615 LACTATE (LD) (LDH) ENZYME: CPT

## 2019-01-01 PROCEDURE — 94761 N-INVAS EAR/PLS OXIMETRY MLT: CPT

## 2019-01-01 PROCEDURE — 2580000003 HC RX 258: Performed by: NURSE PRACTITIONER

## 2019-01-01 PROCEDURE — 6360000002 HC RX W HCPCS: Performed by: NURSE PRACTITIONER

## 2019-01-01 PROCEDURE — 80053 COMPREHEN METABOLIC PANEL: CPT

## 2019-01-01 PROCEDURE — 2580000003 HC RX 258: Performed by: INTERNAL MEDICINE

## 2019-01-01 PROCEDURE — 96372 THER/PROPH/DIAG INJ SC/IM: CPT

## 2019-01-01 PROCEDURE — 71260 CT THORAX DX C+: CPT

## 2019-01-01 PROCEDURE — 2500000003 HC RX 250 WO HCPCS: Performed by: FAMILY MEDICINE

## 2019-01-01 PROCEDURE — 83690 ASSAY OF LIPASE: CPT

## 2019-01-01 PROCEDURE — 85610 PROTHROMBIN TIME: CPT

## 2019-01-01 PROCEDURE — 97535 SELF CARE MNGMENT TRAINING: CPT

## 2019-01-01 PROCEDURE — 72131 CT LUMBAR SPINE W/O DYE: CPT

## 2019-01-01 PROCEDURE — 83880 ASSAY OF NATRIURETIC PEPTIDE: CPT

## 2019-01-01 PROCEDURE — 97163 PT EVAL HIGH COMPLEX 45 MIN: CPT

## 2019-01-01 PROCEDURE — 6360000002 HC RX W HCPCS: Performed by: INTERNAL MEDICINE

## 2019-01-01 PROCEDURE — 85027 COMPLETE CBC AUTOMATED: CPT

## 2019-01-01 PROCEDURE — 85730 THROMBOPLASTIN TIME PARTIAL: CPT

## 2019-01-01 PROCEDURE — 74177 CT ABD & PELVIS W/CONTRAST: CPT

## 2019-01-01 PROCEDURE — 97167 OT EVAL HIGH COMPLEX 60 MIN: CPT

## 2019-01-01 PROCEDURE — 1200000000 HC SEMI PRIVATE

## 2019-01-01 PROCEDURE — 84484 ASSAY OF TROPONIN QUANT: CPT

## 2019-01-01 PROCEDURE — 70553 MRI BRAIN STEM W/O & W/DYE: CPT

## 2019-01-01 PROCEDURE — 97530 THERAPEUTIC ACTIVITIES: CPT

## 2019-01-01 PROCEDURE — 82550 ASSAY OF CK (CPK): CPT

## 2019-01-01 RX ORDER — MORPHINE SULFATE/0.9% NACL/PF 1 MG/ML
SYRINGE (ML) INJECTION CONTINUOUS
Status: DISCONTINUED | OUTPATIENT
Start: 2019-01-01 | End: 2019-01-01 | Stop reason: HOSPADM

## 2019-01-01 RX ORDER — MIDAZOLAM HYDROCHLORIDE 1 MG/ML
2 INJECTION INTRAMUSCULAR; INTRAVENOUS EVERY 4 HOURS PRN
Status: DISCONTINUED | OUTPATIENT
Start: 2019-01-01 | End: 2019-01-01 | Stop reason: HOSPADM

## 2019-01-01 RX ORDER — MIRTAZAPINE 15 MG/1
7.5 TABLET, FILM COATED ORAL NIGHTLY
Status: DISCONTINUED | OUTPATIENT
Start: 2019-01-01 | End: 2019-01-01

## 2019-01-01 RX ORDER — ACETAMINOPHEN 325 MG/1
650 TABLET ORAL EVERY 4 HOURS PRN
Status: DISCONTINUED | OUTPATIENT
Start: 2019-01-01 | End: 2019-01-01 | Stop reason: HOSPADM

## 2019-01-01 RX ORDER — LORAZEPAM 2 MG/ML
1 INJECTION INTRAMUSCULAR EVERY 6 HOURS PRN
Status: DISCONTINUED | OUTPATIENT
Start: 2019-01-01 | End: 2019-01-01

## 2019-01-01 RX ORDER — 0.9 % SODIUM CHLORIDE 0.9 %
50 VIAL (ML) INJECTION ONCE
Status: COMPLETED | OUTPATIENT
Start: 2019-01-01 | End: 2019-01-01

## 2019-01-01 RX ORDER — LORAZEPAM 2 MG/ML
1 INJECTION INTRAMUSCULAR EVERY 6 HOURS PRN
Status: CANCELLED | OUTPATIENT
Start: 2019-01-01

## 2019-01-01 RX ORDER — SERTRALINE HYDROCHLORIDE 100 MG/1
100 TABLET, FILM COATED ORAL DAILY
Status: DISCONTINUED | OUTPATIENT
Start: 2019-01-01 | End: 2019-01-01 | Stop reason: HOSPADM

## 2019-01-01 RX ORDER — HALOPERIDOL 5 MG/ML
1 INJECTION INTRAMUSCULAR EVERY 4 HOURS
Status: DISCONTINUED | OUTPATIENT
Start: 2019-01-01 | End: 2019-01-01 | Stop reason: HOSPADM

## 2019-01-01 RX ORDER — SERTRALINE HYDROCHLORIDE 100 MG/1
100 TABLET, FILM COATED ORAL DAILY
Status: DISCONTINUED | OUTPATIENT
Start: 2019-01-01 | End: 2019-01-01

## 2019-01-01 RX ORDER — HALOPERIDOL 5 MG/ML
1 INJECTION INTRAMUSCULAR EVERY 4 HOURS PRN
Status: DISCONTINUED | OUTPATIENT
Start: 2019-01-01 | End: 2019-01-01 | Stop reason: HOSPADM

## 2019-01-01 RX ORDER — DEXAMETHASONE 4 MG/1
TABLET ORAL
Refills: 3 | COMMUNITY
Start: 2019-01-01

## 2019-01-01 RX ORDER — LORAZEPAM 2 MG/ML
1 INJECTION INTRAMUSCULAR
Status: DISCONTINUED | OUTPATIENT
Start: 2019-01-01 | End: 2019-01-01 | Stop reason: HOSPADM

## 2019-01-01 RX ORDER — 0.9 % SODIUM CHLORIDE 0.9 %
1000 INTRAVENOUS SOLUTION INTRAVENOUS ONCE
Status: COMPLETED | OUTPATIENT
Start: 2019-01-01 | End: 2019-01-01

## 2019-01-01 RX ORDER — SENNA PLUS 8.6 MG/1
2 TABLET ORAL NIGHTLY
Status: DISCONTINUED | OUTPATIENT
Start: 2019-01-01 | End: 2019-01-01

## 2019-01-01 RX ORDER — MORPHINE SULFATE 30 MG/1
30 TABLET, FILM COATED, EXTENDED RELEASE ORAL EVERY 12 HOURS SCHEDULED
Status: DISCONTINUED | OUTPATIENT
Start: 2019-01-01 | End: 2019-01-01 | Stop reason: HOSPADM

## 2019-01-01 RX ORDER — GLYCOPYRROLATE 1 MG/5 ML
0.1 SYRINGE (ML) INTRAVENOUS EVERY 4 HOURS PRN
Status: DISCONTINUED | OUTPATIENT
Start: 2019-01-01 | End: 2019-01-01 | Stop reason: HOSPADM

## 2019-01-01 RX ORDER — DULOXETIN HYDROCHLORIDE 30 MG/1
30 CAPSULE, DELAYED RELEASE ORAL DAILY
Status: DISCONTINUED | OUTPATIENT
Start: 2019-01-01 | End: 2019-01-01 | Stop reason: HOSPADM

## 2019-01-01 RX ORDER — OXYCODONE HYDROCHLORIDE 5 MG/1
5 TABLET ORAL EVERY 4 HOURS PRN
Status: DISCONTINUED | OUTPATIENT
Start: 2019-01-01 | End: 2019-01-01 | Stop reason: HOSPADM

## 2019-01-01 RX ORDER — SODIUM CHLORIDE 9 MG/ML
INJECTION, SOLUTION INTRAVENOUS CONTINUOUS
Status: DISCONTINUED | OUTPATIENT
Start: 2019-01-01 | End: 2019-01-01 | Stop reason: HOSPADM

## 2019-01-01 RX ORDER — SODIUM CHLORIDE 0.9 % (FLUSH) 0.9 %
10 SYRINGE (ML) INJECTION PRN
Status: DISCONTINUED | OUTPATIENT
Start: 2019-01-01 | End: 2019-01-01 | Stop reason: HOSPADM

## 2019-01-01 RX ORDER — LORAZEPAM 2 MG/ML
0.5 INJECTION INTRAMUSCULAR
Status: DISCONTINUED | OUTPATIENT
Start: 2019-01-01 | End: 2019-01-01

## 2019-01-01 RX ORDER — SODIUM CHLORIDE 9 MG/ML
INJECTION, SOLUTION INTRAVENOUS CONTINUOUS
Status: DISPENSED | OUTPATIENT
Start: 2019-01-01 | End: 2019-01-01

## 2019-01-01 RX ORDER — MORPHINE SULFATE 4 MG/ML
2 INJECTION, SOLUTION INTRAMUSCULAR; INTRAVENOUS
Status: DISCONTINUED | OUTPATIENT
Start: 2019-01-01 | End: 2019-01-01 | Stop reason: HOSPADM

## 2019-01-01 RX ORDER — SODIUM CHLORIDE 0.9 % (FLUSH) 0.9 %
10 SYRINGE (ML) INJECTION EVERY 12 HOURS SCHEDULED
Status: DISCONTINUED | OUTPATIENT
Start: 2019-01-01 | End: 2019-01-01 | Stop reason: HOSPADM

## 2019-01-01 RX ORDER — MORPHINE SULFATE 30 MG/1
1 TABLET, FILM COATED, EXTENDED RELEASE ORAL EVERY 12 HOURS PRN
Refills: 0 | COMMUNITY
Start: 2019-01-01

## 2019-01-01 RX ORDER — HALOPERIDOL 5 MG/ML
1 INJECTION INTRAMUSCULAR EVERY 6 HOURS SCHEDULED
Status: DISCONTINUED | OUTPATIENT
Start: 2019-01-01 | End: 2019-01-01

## 2019-01-01 RX ORDER — MORPHINE SULFATE 4 MG/ML
4 INJECTION, SOLUTION INTRAMUSCULAR; INTRAVENOUS
Status: DISCONTINUED | OUTPATIENT
Start: 2019-01-01 | End: 2019-01-01 | Stop reason: HOSPADM

## 2019-01-01 RX ORDER — BISACODYL 10 MG
10 SUPPOSITORY, RECTAL RECTAL DAILY PRN
Status: DISCONTINUED | OUTPATIENT
Start: 2019-01-01 | End: 2019-01-01 | Stop reason: HOSPADM

## 2019-01-01 RX ORDER — ONDANSETRON 2 MG/ML
4 INJECTION INTRAMUSCULAR; INTRAVENOUS EVERY 6 HOURS PRN
Status: DISCONTINUED | OUTPATIENT
Start: 2019-01-01 | End: 2019-01-01 | Stop reason: HOSPADM

## 2019-01-01 RX ORDER — DULOXETIN HYDROCHLORIDE 30 MG/1
CAPSULE, DELAYED RELEASE ORAL
Refills: 3 | COMMUNITY
Start: 2019-01-01

## 2019-01-01 RX ORDER — ONDANSETRON 2 MG/ML
4 INJECTION INTRAMUSCULAR; INTRAVENOUS EVERY 30 MIN PRN
Status: CANCELLED | OUTPATIENT
Start: 2019-01-01

## 2019-01-01 RX ORDER — DULOXETIN HYDROCHLORIDE 30 MG/1
30 CAPSULE, DELAYED RELEASE ORAL DAILY
Status: DISCONTINUED | OUTPATIENT
Start: 2019-01-01 | End: 2019-01-01

## 2019-01-01 RX ORDER — MIRTAZAPINE 15 MG/1
7.5 TABLET, FILM COATED ORAL DAILY
Refills: 2 | COMMUNITY
Start: 2019-01-01

## 2019-01-01 RX ORDER — SENNA PLUS 8.6 MG/1
2 TABLET ORAL NIGHTLY
Status: DISCONTINUED | OUTPATIENT
Start: 2019-01-01 | End: 2019-01-01 | Stop reason: HOSPADM

## 2019-01-01 RX ORDER — LORAZEPAM 2 MG/ML
0.5 INJECTION INTRAMUSCULAR
Status: DISCONTINUED | OUTPATIENT
Start: 2019-01-01 | End: 2019-01-01 | Stop reason: HOSPADM

## 2019-01-01 RX ORDER — HALOPERIDOL 5 MG/ML
1 INJECTION INTRAMUSCULAR EVERY 8 HOURS SCHEDULED
Status: DISCONTINUED | OUTPATIENT
Start: 2019-01-01 | End: 2019-01-01

## 2019-01-01 RX ORDER — ONDANSETRON 2 MG/ML
4 INJECTION INTRAMUSCULAR; INTRAVENOUS EVERY 30 MIN PRN
Status: DISCONTINUED | OUTPATIENT
Start: 2019-01-01 | End: 2019-01-01

## 2019-01-01 RX ORDER — ONDANSETRON 2 MG/ML
4 INJECTION INTRAMUSCULAR; INTRAVENOUS EVERY 6 HOURS PRN
Status: CANCELLED | OUTPATIENT
Start: 2019-01-01

## 2019-01-01 RX ORDER — MIRTAZAPINE 15 MG/1
7.5 TABLET, FILM COATED ORAL NIGHTLY
Status: DISCONTINUED | OUTPATIENT
Start: 2019-01-01 | End: 2019-01-01 | Stop reason: HOSPADM

## 2019-01-01 RX ORDER — ACETAMINOPHEN 650 MG/1
650 SUPPOSITORY RECTAL EVERY 4 HOURS PRN
Status: DISCONTINUED | OUTPATIENT
Start: 2019-01-01 | End: 2019-01-01 | Stop reason: HOSPADM

## 2019-01-01 RX ORDER — SODIUM CHLORIDE 0.9 % (FLUSH) 0.9 %
10 SYRINGE (ML) INJECTION ONCE
Status: COMPLETED | OUTPATIENT
Start: 2019-01-01 | End: 2019-01-01

## 2019-01-01 RX ORDER — DRONABINOL 2.5 MG/1
5 CAPSULE ORAL 2 TIMES DAILY WITH MEALS
Status: DISCONTINUED | OUTPATIENT
Start: 2019-01-01 | End: 2019-01-01 | Stop reason: HOSPADM

## 2019-01-01 RX ORDER — MORPHINE SULFATE 4 MG/ML
1 INJECTION, SOLUTION INTRAMUSCULAR; INTRAVENOUS
Status: DISCONTINUED | OUTPATIENT
Start: 2019-01-01 | End: 2019-01-01 | Stop reason: HOSPADM

## 2019-01-01 RX ORDER — TRAZODONE HYDROCHLORIDE 50 MG/1
50 TABLET ORAL NIGHTLY
Refills: 1 | COMMUNITY
Start: 2019-01-01

## 2019-01-01 RX ORDER — DRONABINOL 5 MG/1
CAPSULE ORAL
Refills: 0 | COMMUNITY
Start: 2019-01-01

## 2019-01-01 RX ORDER — ONDANSETRON 2 MG/ML
4 INJECTION INTRAMUSCULAR; INTRAVENOUS EVERY 30 MIN PRN
Status: DISCONTINUED | OUTPATIENT
Start: 2019-01-01 | End: 2019-01-01 | Stop reason: HOSPADM

## 2019-01-01 RX ORDER — MORPHINE SULFATE/0.9% NACL/PF 1 MG/ML
SYRINGE (ML) INJECTION CONTINUOUS
Status: CANCELLED | OUTPATIENT
Start: 2019-01-01

## 2019-01-01 RX ORDER — MORPHINE SULFATE 30 MG/1
30 TABLET, FILM COATED, EXTENDED RELEASE ORAL EVERY 12 HOURS SCHEDULED
Status: DISCONTINUED | OUTPATIENT
Start: 2019-01-01 | End: 2019-01-01

## 2019-01-01 RX ADMIN — GADOTERIDOL 12 ML: 279.3 INJECTION, SOLUTION INTRAVENOUS at 12:09

## 2019-01-01 RX ADMIN — MIRTAZAPINE 7.5 MG: 15 TABLET, FILM COATED ORAL at 21:11

## 2019-01-01 RX ADMIN — ENOXAPARIN SODIUM 40 MG: 40 INJECTION SUBCUTANEOUS at 08:26

## 2019-01-01 RX ADMIN — IOHEXOL 50 ML: 240 INJECTION, SOLUTION INTRATHECAL; INTRAVASCULAR; INTRAVENOUS; ORAL at 10:31

## 2019-01-01 RX ADMIN — DULOXETINE HYDROCHLORIDE 30 MG: 30 CAPSULE, DELAYED RELEASE ORAL at 08:26

## 2019-01-01 RX ADMIN — DRONABINOL 5 MG: 2.5 CAPSULE ORAL at 18:32

## 2019-01-01 RX ADMIN — HALOPERIDOL LACTATE 1 MG: 5 INJECTION INTRAMUSCULAR at 17:10

## 2019-01-01 RX ADMIN — OXYCODONE HYDROCHLORIDE 5 MG: 5 TABLET ORAL at 03:35

## 2019-01-01 RX ADMIN — MORPHINE SULFATE 30 MG: 30 TABLET, FILM COATED, EXTENDED RELEASE ORAL at 22:08

## 2019-01-01 RX ADMIN — DRONABINOL 5 MG: 2.5 CAPSULE ORAL at 08:17

## 2019-01-01 RX ADMIN — DRONABINOL 5 MG: 2.5 CAPSULE ORAL at 09:41

## 2019-01-01 RX ADMIN — LORAZEPAM 0.5 MG: 2 INJECTION INTRAMUSCULAR; INTRAVENOUS at 11:48

## 2019-01-01 RX ADMIN — DULOXETINE HYDROCHLORIDE 30 MG: 30 CAPSULE, DELAYED RELEASE ORAL at 09:39

## 2019-01-01 RX ADMIN — HALOPERIDOL LACTATE 1 MG: 5 INJECTION, SOLUTION INTRAMUSCULAR at 10:11

## 2019-01-01 RX ADMIN — SERTRALINE HYDROCHLORIDE 100 MG: 100 TABLET ORAL at 08:45

## 2019-01-01 RX ADMIN — MIRTAZAPINE 7.5 MG: 15 TABLET, FILM COATED ORAL at 21:14

## 2019-01-01 RX ADMIN — HALOPERIDOL LACTATE 1 MG: 5 INJECTION INTRAMUSCULAR at 05:38

## 2019-01-01 RX ADMIN — SENNOSIDES 17.2 MG: 8.6 TABLET, FILM COATED ORAL at 21:12

## 2019-01-01 RX ADMIN — HALOPERIDOL LACTATE 1 MG: 5 INJECTION INTRAMUSCULAR at 23:09

## 2019-01-01 RX ADMIN — MORPHINE SULFATE 2 MG: 4 INJECTION, SOLUTION INTRAMUSCULAR; INTRAVENOUS at 20:03

## 2019-01-01 RX ADMIN — OXYCODONE HYDROCHLORIDE 5 MG: 5 TABLET ORAL at 08:17

## 2019-01-01 RX ADMIN — LORAZEPAM 0.5 MG: 2 INJECTION INTRAMUSCULAR; INTRAVENOUS at 05:54

## 2019-01-01 RX ADMIN — SODIUM CHLORIDE: 9 INJECTION, SOLUTION INTRAVENOUS at 14:41

## 2019-01-01 RX ADMIN — SERTRALINE HYDROCHLORIDE 100 MG: 100 TABLET ORAL at 08:17

## 2019-01-01 RX ADMIN — SERTRALINE HYDROCHLORIDE 100 MG: 100 TABLET ORAL at 09:04

## 2019-01-01 RX ADMIN — IOHEXOL 50 ML: 240 INJECTION, SOLUTION INTRATHECAL; INTRAVASCULAR; INTRAVENOUS; ORAL at 15:38

## 2019-01-01 RX ADMIN — DRONABINOL 5 MG: 2.5 CAPSULE ORAL at 17:58

## 2019-01-01 RX ADMIN — SERTRALINE HYDROCHLORIDE 100 MG: 100 TABLET ORAL at 09:39

## 2019-01-01 RX ADMIN — OXYCODONE HYDROCHLORIDE 5 MG: 5 TABLET ORAL at 10:41

## 2019-01-01 RX ADMIN — SODIUM CHLORIDE, PRESERVATIVE FREE 10 ML: 5 INJECTION INTRAVENOUS at 08:29

## 2019-01-01 RX ADMIN — Medication 1 MG: at 15:28

## 2019-01-01 RX ADMIN — HALOPERIDOL LACTATE 1 MG: 5 INJECTION INTRAMUSCULAR at 14:26

## 2019-01-01 RX ADMIN — Medication 50 ML: at 15:40

## 2019-01-01 RX ADMIN — MIRTAZAPINE 7.5 MG: 15 TABLET, FILM COATED ORAL at 21:02

## 2019-01-01 RX ADMIN — MORPHINE SULFATE 4 MG: 4 INJECTION, SOLUTION INTRAMUSCULAR; INTRAVENOUS at 16:56

## 2019-01-01 RX ADMIN — DRONABINOL 5 MG: 2.5 CAPSULE ORAL at 17:05

## 2019-01-01 RX ADMIN — Medication 1.5 MG: at 11:54

## 2019-01-01 RX ADMIN — MORPHINE SULFATE 2 MG: 4 INJECTION, SOLUTION INTRAMUSCULAR; INTRAVENOUS at 08:45

## 2019-01-01 RX ADMIN — OXYCODONE HYDROCHLORIDE 5 MG: 5 TABLET ORAL at 09:47

## 2019-01-01 RX ADMIN — HALOPERIDOL LACTATE 1 MG: 5 INJECTION, SOLUTION INTRAMUSCULAR at 14:17

## 2019-01-01 RX ADMIN — ENOXAPARIN SODIUM 40 MG: 40 INJECTION SUBCUTANEOUS at 09:38

## 2019-01-01 RX ADMIN — MORPHINE SULFATE 2 MG: 4 INJECTION, SOLUTION INTRAMUSCULAR; INTRAVENOUS at 17:50

## 2019-01-01 RX ADMIN — MORPHINE SULFATE 2 MG: 4 INJECTION, SOLUTION INTRAMUSCULAR; INTRAVENOUS at 10:53

## 2019-01-01 RX ADMIN — OXYCODONE HYDROCHLORIDE 5 MG: 5 TABLET ORAL at 13:01

## 2019-01-01 RX ADMIN — ENOXAPARIN SODIUM 40 MG: 40 INJECTION SUBCUTANEOUS at 17:42

## 2019-01-01 RX ADMIN — DULOXETINE HYDROCHLORIDE 30 MG: 30 CAPSULE, DELAYED RELEASE ORAL at 09:04

## 2019-01-01 RX ADMIN — MORPHINE SULFATE 2 MG: 4 INJECTION, SOLUTION INTRAMUSCULAR; INTRAVENOUS at 15:49

## 2019-01-01 RX ADMIN — DULOXETINE HYDROCHLORIDE 30 MG: 30 CAPSULE, DELAYED RELEASE ORAL at 09:41

## 2019-01-01 RX ADMIN — LORAZEPAM 1 MG: 2 INJECTION INTRAMUSCULAR; INTRAVENOUS at 17:50

## 2019-01-01 RX ADMIN — OXYCODONE HYDROCHLORIDE 5 MG: 5 TABLET ORAL at 16:32

## 2019-01-01 RX ADMIN — HALOPERIDOL LACTATE 1 MG: 5 INJECTION INTRAMUSCULAR at 16:56

## 2019-01-01 RX ADMIN — MIRTAZAPINE 7.5 MG: 15 TABLET, FILM COATED ORAL at 22:08

## 2019-01-01 RX ADMIN — MORPHINE SULFATE 30 MG: 30 TABLET, FILM COATED, EXTENDED RELEASE ORAL at 08:45

## 2019-01-01 RX ADMIN — HALOPERIDOL LACTATE 1 MG: 5 INJECTION, SOLUTION INTRAMUSCULAR at 06:16

## 2019-01-01 RX ADMIN — SODIUM CHLORIDE: 9 INJECTION, SOLUTION INTRAVENOUS at 03:24

## 2019-01-01 RX ADMIN — MORPHINE SULFATE 4 MG: 4 INJECTION, SOLUTION INTRAMUSCULAR; INTRAVENOUS at 03:38

## 2019-01-01 RX ADMIN — HALOPERIDOL LACTATE 1 MG: 5 INJECTION INTRAMUSCULAR at 23:21

## 2019-01-01 RX ADMIN — MORPHINE SULFATE 30 MG: 1 INJECTION INTRAVENOUS at 14:40

## 2019-01-01 RX ADMIN — SODIUM CHLORIDE, PRESERVATIVE FREE 10 ML: 5 INJECTION INTRAVENOUS at 21:02

## 2019-01-01 RX ADMIN — ACETAMINOPHEN 650 MG: 650 SUPPOSITORY RECTAL at 06:01

## 2019-01-01 RX ADMIN — Medication 0.1 MG: at 07:58

## 2019-01-01 RX ADMIN — MORPHINE SULFATE 2 MG: 4 INJECTION, SOLUTION INTRAMUSCULAR; INTRAVENOUS at 17:49

## 2019-01-01 RX ADMIN — SERTRALINE HYDROCHLORIDE 100 MG: 100 TABLET ORAL at 08:26

## 2019-01-01 RX ADMIN — MORPHINE SULFATE 4 MG: 4 INJECTION, SOLUTION INTRAMUSCULAR; INTRAVENOUS at 15:45

## 2019-01-01 RX ADMIN — LORAZEPAM 0.5 MG: 2 INJECTION INTRAMUSCULAR; INTRAVENOUS at 22:43

## 2019-01-01 RX ADMIN — HALOPERIDOL LACTATE 1 MG: 5 INJECTION, SOLUTION INTRAMUSCULAR at 18:17

## 2019-01-01 RX ADMIN — HALOPERIDOL LACTATE 1 MG: 5 INJECTION, SOLUTION INTRAMUSCULAR at 02:09

## 2019-01-01 RX ADMIN — MIRTAZAPINE 7.5 MG: 15 TABLET, FILM COATED ORAL at 22:38

## 2019-01-01 RX ADMIN — MORPHINE SULFATE 30 MG: 30 TABLET, FILM COATED, EXTENDED RELEASE ORAL at 09:44

## 2019-01-01 RX ADMIN — SODIUM CHLORIDE 1000 ML: 9 INJECTION, SOLUTION INTRAVENOUS at 15:28

## 2019-01-01 RX ADMIN — MORPHINE SULFATE 30 MG: 30 TABLET, FILM COATED, EXTENDED RELEASE ORAL at 09:04

## 2019-01-01 RX ADMIN — Medication 30 MG: at 16:10

## 2019-01-01 RX ADMIN — Medication 0.1 MG: at 18:09

## 2019-01-01 RX ADMIN — DRONABINOL 5 MG: 2.5 CAPSULE ORAL at 08:26

## 2019-01-01 RX ADMIN — OXYCODONE HYDROCHLORIDE 5 MG: 5 TABLET ORAL at 17:58

## 2019-01-01 RX ADMIN — Medication 1.5 MG: at 08:18

## 2019-01-01 RX ADMIN — LORAZEPAM 0.5 MG: 2 INJECTION INTRAMUSCULAR; INTRAVENOUS at 22:19

## 2019-01-01 RX ADMIN — MORPHINE SULFATE 30 MG: 30 TABLET, FILM COATED, EXTENDED RELEASE ORAL at 22:38

## 2019-01-01 RX ADMIN — MORPHINE SULFATE 30 MG: 30 TABLET, FILM COATED, EXTENDED RELEASE ORAL at 21:14

## 2019-01-01 RX ADMIN — LORAZEPAM 1 MG: 2 INJECTION INTRAMUSCULAR; INTRAVENOUS at 03:38

## 2019-01-01 RX ADMIN — ENOXAPARIN SODIUM 40 MG: 40 INJECTION SUBCUTANEOUS at 09:40

## 2019-01-01 RX ADMIN — MORPHINE SULFATE 30 MG: 30 TABLET, FILM COATED, EXTENDED RELEASE ORAL at 21:12

## 2019-01-01 RX ADMIN — DULOXETINE HYDROCHLORIDE 30 MG: 30 CAPSULE, DELAYED RELEASE ORAL at 08:45

## 2019-01-01 RX ADMIN — LORAZEPAM 0.5 MG: 2 INJECTION INTRAMUSCULAR; INTRAVENOUS at 15:44

## 2019-01-01 RX ADMIN — DRONABINOL 5 MG: 2.5 CAPSULE ORAL at 18:54

## 2019-01-01 RX ADMIN — ENOXAPARIN SODIUM 40 MG: 40 INJECTION SUBCUTANEOUS at 08:17

## 2019-01-01 RX ADMIN — SENNOSIDES 17.2 MG: 8.6 TABLET, FILM COATED ORAL at 21:14

## 2019-01-01 RX ADMIN — DULOXETINE HYDROCHLORIDE 30 MG: 30 CAPSULE, DELAYED RELEASE ORAL at 08:17

## 2019-01-01 RX ADMIN — DRONABINOL 5 MG: 2.5 CAPSULE ORAL at 09:38

## 2019-01-01 RX ADMIN — MORPHINE SULFATE 4 MG: 4 INJECTION, SOLUTION INTRAMUSCULAR; INTRAVENOUS at 13:10

## 2019-01-01 RX ADMIN — MORPHINE SULFATE 4 MG: 4 INJECTION, SOLUTION INTRAMUSCULAR; INTRAVENOUS at 23:43

## 2019-01-01 RX ADMIN — LORAZEPAM 1 MG: 2 INJECTION INTRAMUSCULAR; INTRAVENOUS at 20:03

## 2019-01-01 RX ADMIN — HALOPERIDOL LACTATE 1 MG: 5 INJECTION, SOLUTION INTRAMUSCULAR at 17:58

## 2019-01-01 RX ADMIN — LORAZEPAM 1 MG: 2 INJECTION INTRAMUSCULAR; INTRAVENOUS at 01:09

## 2019-01-01 RX ADMIN — MORPHINE SULFATE 30 MG: 30 TABLET, FILM COATED, EXTENDED RELEASE ORAL at 08:26

## 2019-01-01 RX ADMIN — SODIUM CHLORIDE, PRESERVATIVE FREE 10 ML: 5 INJECTION INTRAVENOUS at 15:38

## 2019-01-01 RX ADMIN — LORAZEPAM 0.5 MG: 2 INJECTION INTRAMUSCULAR; INTRAVENOUS at 15:49

## 2019-01-01 RX ADMIN — HALOPERIDOL LACTATE 1 MG: 5 INJECTION, SOLUTION INTRAMUSCULAR at 13:57

## 2019-01-01 RX ADMIN — Medication 1 MG: at 05:47

## 2019-01-01 RX ADMIN — SODIUM CHLORIDE, PRESERVATIVE FREE 10 ML: 5 INJECTION INTRAVENOUS at 22:40

## 2019-01-01 RX ADMIN — HALOPERIDOL LACTATE 1 MG: 5 INJECTION INTRAMUSCULAR at 10:49

## 2019-01-01 RX ADMIN — IOPAMIDOL 100 ML: 755 INJECTION, SOLUTION INTRAVENOUS at 10:31

## 2019-01-01 RX ADMIN — IOPAMIDOL 75 ML: 755 INJECTION, SOLUTION INTRAVENOUS at 15:38

## 2019-01-01 RX ADMIN — MORPHINE SULFATE 30 MG: 30 TABLET, FILM COATED, EXTENDED RELEASE ORAL at 09:40

## 2019-01-01 RX ADMIN — SENNOSIDES 17.2 MG: 8.6 TABLET, FILM COATED ORAL at 22:08

## 2019-01-01 RX ADMIN — LORAZEPAM 1 MG: 2 INJECTION INTRAMUSCULAR; INTRAVENOUS at 11:30

## 2019-01-01 RX ADMIN — Medication 2 MG: at 00:54

## 2019-01-01 RX ADMIN — LORAZEPAM 1 MG: 2 INJECTION INTRAMUSCULAR; INTRAVENOUS at 13:42

## 2019-01-01 RX ADMIN — Medication 0.1 MG: at 23:43

## 2019-01-01 RX ADMIN — LORAZEPAM 0.5 MG: 2 INJECTION INTRAMUSCULAR; INTRAVENOUS at 08:20

## 2019-01-01 RX ADMIN — HALOPERIDOL LACTATE 1 MG: 5 INJECTION, SOLUTION INTRAMUSCULAR at 22:43

## 2019-01-01 RX ADMIN — HALOPERIDOL LACTATE 1 MG: 5 INJECTION, SOLUTION INTRAMUSCULAR at 22:06

## 2019-01-01 RX ADMIN — SERTRALINE HYDROCHLORIDE 100 MG: 100 TABLET ORAL at 09:41

## 2019-01-01 RX ADMIN — HALOPERIDOL LACTATE 1 MG: 5 INJECTION INTRAMUSCULAR at 18:54

## 2019-01-01 RX ADMIN — MORPHINE SULFATE 4 MG: 4 INJECTION, SOLUTION INTRAMUSCULAR; INTRAVENOUS at 17:58

## 2019-01-01 RX ADMIN — SODIUM CHLORIDE: 9 INJECTION, SOLUTION INTRAVENOUS at 07:07

## 2019-01-01 RX ADMIN — MORPHINE SULFATE 30 MG: 30 TABLET, FILM COATED, EXTENDED RELEASE ORAL at 15:03

## 2019-01-01 RX ADMIN — SODIUM CHLORIDE: 9 INJECTION, SOLUTION INTRAVENOUS at 05:38

## 2019-01-01 RX ADMIN — SODIUM CHLORIDE, PRESERVATIVE FREE 10 ML: 5 INJECTION INTRAVENOUS at 09:41

## 2019-01-01 RX ADMIN — MORPHINE SULFATE 2 MG: 4 INJECTION, SOLUTION INTRAMUSCULAR; INTRAVENOUS at 15:57

## 2019-01-01 RX ADMIN — SODIUM CHLORIDE: 9 INJECTION, SOLUTION INTRAVENOUS at 17:43

## 2019-01-01 RX ADMIN — HALOPERIDOL LACTATE 1 MG: 5 INJECTION, SOLUTION INTRAMUSCULAR at 02:30

## 2019-01-01 ASSESSMENT — PAIN DESCRIPTION - ONSET
ONSET: PROGRESSIVE
ONSET: ON-GOING
ONSET: AWAKENED FROM SLEEP
ONSET: ON-GOING
ONSET: ON-GOING

## 2019-01-01 ASSESSMENT — PAIN DESCRIPTION - PROGRESSION
CLINICAL_PROGRESSION: NOT CHANGED
CLINICAL_PROGRESSION: RESOLVED
CLINICAL_PROGRESSION: NOT CHANGED
CLINICAL_PROGRESSION: RESOLVED
CLINICAL_PROGRESSION: NOT CHANGED

## 2019-01-01 ASSESSMENT — PAIN SCALES - GENERAL
PAINLEVEL_OUTOF10: 8
PAINLEVEL_OUTOF10: 0
PAINLEVEL_OUTOF10: 4
PAINLEVEL_OUTOF10: 0
PAINLEVEL_OUTOF10: 0
PAINLEVEL_OUTOF10: 9
PAINLEVEL_OUTOF10: 0
PAINLEVEL_OUTOF10: 0
PAINLEVEL_OUTOF10: 5
PAINLEVEL_OUTOF10: 7
PAINLEVEL_OUTOF10: 6
PAINLEVEL_OUTOF10: 7
PAINLEVEL_OUTOF10: 4
PAINLEVEL_OUTOF10: 7
PAINLEVEL_OUTOF10: 0
PAINLEVEL_OUTOF10: 0
PAINLEVEL_OUTOF10: 4
PAINLEVEL_OUTOF10: 5
PAINLEVEL_OUTOF10: 3
PAINLEVEL_OUTOF10: 0
PAINLEVEL_OUTOF10: 5
PAINLEVEL_OUTOF10: 6
PAINLEVEL_OUTOF10: 5
PAINLEVEL_OUTOF10: 6
PAINLEVEL_OUTOF10: 0
PAINLEVEL_OUTOF10: 5
PAINLEVEL_OUTOF10: 7
PAINLEVEL_OUTOF10: 0
PAINLEVEL_OUTOF10: 0
PAINLEVEL_OUTOF10: 7
PAINLEVEL_OUTOF10: 5
PAINLEVEL_OUTOF10: 0
PAINLEVEL_OUTOF10: 5
PAINLEVEL_OUTOF10: 5
PAINLEVEL_OUTOF10: 0
PAINLEVEL_OUTOF10: 0
PAINLEVEL_OUTOF10: 8
PAINLEVEL_OUTOF10: 6
PAINLEVEL_OUTOF10: 0
PAINLEVEL_OUTOF10: 2
PAINLEVEL_OUTOF10: 10
PAINLEVEL_OUTOF10: 0
PAINLEVEL_OUTOF10: 7
PAINLEVEL_OUTOF10: 0
PAINLEVEL_OUTOF10: 6
PAINLEVEL_OUTOF10: 0
PAINLEVEL_OUTOF10: 6
PAINLEVEL_OUTOF10: 0
PAINLEVEL_OUTOF10: 7
PAINLEVEL_OUTOF10: 0
PAINLEVEL_OUTOF10: 7
PAINLEVEL_OUTOF10: 0
PAINLEVEL_OUTOF10: 0
PAINLEVEL_OUTOF10: 6
PAINLEVEL_OUTOF10: 4
PAINLEVEL_OUTOF10: 0
PAINLEVEL_OUTOF10: 6
PAINLEVEL_OUTOF10: 8
PAINLEVEL_OUTOF10: 4
PAINLEVEL_OUTOF10: 0
PAINLEVEL_OUTOF10: 0

## 2019-01-01 ASSESSMENT — PAIN DESCRIPTION - PAIN TYPE
TYPE: CHRONIC PAIN

## 2019-01-01 ASSESSMENT — PAIN DESCRIPTION - DESCRIPTORS
DESCRIPTORS: ACHING
DESCRIPTORS: ACHING
DESCRIPTORS: ACHING;DISCOMFORT
DESCRIPTORS: ACHING

## 2019-01-01 ASSESSMENT — ENCOUNTER SYMPTOMS
RESPIRATORY NEGATIVE: 1
BACK PAIN: 1
GASTROINTESTINAL NEGATIVE: 1
ALLERGIC/IMMUNOLOGIC NEGATIVE: 1
EYES NEGATIVE: 1

## 2019-01-01 ASSESSMENT — PAIN SCALES - WONG BAKER
WONGBAKER_NUMERICALRESPONSE: 0
WONGBAKER_NUMERICALRESPONSE: 4
WONGBAKER_NUMERICALRESPONSE: 0
WONGBAKER_NUMERICALRESPONSE: 0
WONGBAKER_NUMERICALRESPONSE: 4
WONGBAKER_NUMERICALRESPONSE: 0
WONGBAKER_NUMERICALRESPONSE: 2
WONGBAKER_NUMERICALRESPONSE: 0
WONGBAKER_NUMERICALRESPONSE: 0

## 2019-01-01 ASSESSMENT — PAIN DESCRIPTION - FREQUENCY
FREQUENCY: CONTINUOUS

## 2019-01-01 ASSESSMENT — PAIN DESCRIPTION - LOCATION
LOCATION: BACK;HIP
LOCATION: BACK
LOCATION: GENERALIZED
LOCATION: BACK
LOCATION: BACK
LOCATION: GENERALIZED
LOCATION: BACK

## 2019-01-01 ASSESSMENT — PAIN - FUNCTIONAL ASSESSMENT
PAIN_FUNCTIONAL_ASSESSMENT: PREVENTS OR INTERFERES SOME ACTIVE ACTIVITIES AND ADLS

## 2019-01-01 ASSESSMENT — PAIN DESCRIPTION - ORIENTATION
ORIENTATION: RIGHT
ORIENTATION: RIGHT
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
## 2019-05-17 NOTE — H&P
Resp: 17   Temp: 97.7 °F (36.5 °C)   TempSrc: Oral   SpO2: 99%   Weight: 138 lb (62.6 kg)   Height: 6' 2\" (1.88 m)       Physical Exam: 05/17/19     GEN -Awake chronically ill appearing male, sitting upright in bed , NAD. Cachectic body habitus. Appears given age. EYES -PERRLA. No scleral erythema, discharge, or conjunctivitis. HENT -MM are moist. Oral pharynx without exudates, no evidence of thrush. NECK -Supple, no apparent thyromegaly or masses. RESP -CTA, no wheezes, rales or rhonchi. Symmetric chest movement while on RA.   C/V -S1/S2 auscultated. RRR without appreciable M/R/G. No JVD or carotid bruits. Peripheral pulses equal bilaterally and palpable. Cap refill <3 sec. No peripheral edema. GI -Abdomen is soft non distended and without significant TTP. + BS. No masses or guarding. Rectal exam deferred. No HSM   -No CVA/ flank tenderness. Swann catheter is not present. LYMPH-No palpable cervical lymphadenopathy and no hepatosplenomegaly. No petechiae or ecchymoses. MS -No gross joint deformities. SKIN -Normal coloration, warm, dry. NEURO-Cranial nerves appear grossly intact, normal speech, no lateralizing weakness. PSYC-Awake, alert, oriented x 4- person, place, time, situation,  Appropriate affect.     Past Medical History:      Past Medical History:   Diagnosis Date    Anxiety     Back problem     \"Lower Back Hurts Sometimes\"    Bipolar disorder ([facility] [address]     Bradycardia     per old chart( see notes Dr Jorge Irene 11/2017) pt had bradycardia on EKG - lexiscan showed ischemia ant wall- was scheduled for cath- no show- then noted consult with Dr Kaushik Castro 12/1/2017 in old chart- pt to consider having pacemaker insertion( per pt on 1/25/2018-\"have not decided if I want this done or not\"    Depression     Former smoker 11/29/2017    History of exercise stress test 09/13/2017    treadmill    History of nuclear stress test 09/14/2017    cardiolite-moderate ischemia anterior wall LAD    Hx of echocardiogram 2017    EF50-55%,mildly dilated left atrium    HX OTHER MEDICAL     [facility] At [facility]    Lung nodule 2017    Panic attacks     Right Kidney Cancer     Renal cell carcinoma--with mets to T11 vertebrae    Shortness of breath on exertion     Teeth missing     Upper And Lower    Wears glasses        Past Surgical  History:    has a past surgical history that includes bone biopsy (N/A, 2018); Cardiac catheterization (2017); and Inguinal hernia repair (Right, 14). Social History:    FAM HX:   family history includes Cancer in his brother and father; Early Death (age of onset: 58) in his brother; Heart Attack in his father; Heart Disease in his father; High Cholesterol in his father; Other in his brother and sister.     Soc HX:   Social History     Socioeconomic History    Marital status: Single     Spouse name: None    Number of children: None    Years of education: None    Highest education level: None   Occupational History    None   Social Needs    Financial resource strain: None    Food insecurity:     Worry: None     Inability: None    Transportation needs:     Medical: None     Non-medical: None   Tobacco Use    Smoking status: Former Smoker     Packs/day: 1.00     Years: 21.00     Pack years: 21.00     Types: Cigarettes     Last attempt to quit: 1992     Years since quittin.4    Smokeless tobacco: Never Used   Substance and Sexual Activity    Alcohol use: No     Comment: \"Quit , Was Occ\"\"use to drink every day- average 6-8 per day\"    Drug use: No     Comment: \"when younger- last used age 45's\"    Sexual activity: Not Currently   Lifestyle    Physical activity:     Days per week: None     Minutes per session: None    Stress: None   Relationships    Social connections:     Talks on phone: None     Gets together: None     Attends [employer] service: None     Active member of club or input(s): INR in the last 72 hours. Radiology this visit:  Reviewed. Xr Pelvis (1-2 Views)    Result Date: 5/17/2019  EXAMINATION: ONE XRAY VIEW OF THE PELVIS 5/17/2019 1:27 pm COMPARISON: None HISTORY: ORDERING SYSTEM PROVIDED HISTORY: trauma TECHNOLOGIST PROVIDED HISTORY: Reason for exam:->trauma Ordering Physician Provided Reason for Exam: pain Acuity: Acute Type of Exam: Initial Additional signs and symptoms: Patient does have cancer and is currently receiving chemo treatments. He also states he took 5-6 pills of oxycodone since 9:30 because he was scared the pain would start so he feels \"Cloudy \" in the head. FINDINGS: Frontal view of the pelvis demonstrates no acute fracture or dislocation. Normal bony mineralization. Mild bilateral hip osteoarthritis is seen. The SI joints and sacral arcuate lines appear intact. Normal soft tissues. 1. No acute osseous abnormality of the pelvis. Ct Chest W Contrast    Result Date: 4/17/2019  EXAMINATION: CT OF THE ABDOMEN AND PELVIS WITH CONTRAST; CT OF THE CHEST WITH CONTRAST 4/17/2019 2:29 pm TECHNIQUE: CT of the abdomen and pelvis was performed with the administration of intravenous contrast. Multiplanar reformatted images are provided for review. Dose modulation, iterative reconstruction, and/or weight based adjustment of the mA/kV was utilized to reduce the radiation dose to as low as reasonably achievable.; CT of the chest was performed with the administration of intravenous contrast. Multiplanar reformatted images are provided for review. Dose modulation, iterative reconstruction, and/or weight based adjustment of the mA/kV was utilized to reduce the radiation dose to as low as reasonably achievable.  COMPARISON: CT chest, abdomen and pelvis 01/31/2018 HISTORY: ORDERING SYSTEM PROVIDED HISTORY: Malignant neoplasm of right kidney, except renal pelvis [facility]) TECHNOLOGIST PROVIDED HISTORY: Additional Contrast?-> oral Ordering Physician Provided Reason for Exam: kidney cancer, pt states recently had to stop chemo due to reactions.; ORDERING SYSTEM PROVIDED HISTORY: Malignant neoplasm of right kidney, except renal pelvis [facility]) TECHNOLOGIST PROVIDED HISTORY: Ordering Physician Provided Reason for Exam: kidney cancer, pt states recently had to stop chemo due to reactions. FINDINGS: Chest: Mediastinum: Cardiac structures and great vessels appear unremarkable. No pericardial effusion. Posterior mediastinal structures appear unremarkable. No mediastinal or hilar adenopathy. Retrocrural lymph node is 21 x 25 mm axial image 17 of the CT abdomen series, previously 13 x 16 mm. Lungs/pleura: Calcified granulomas are noted lateral within the superior segment right lower lobe. The lungs are otherwise clear. No nodule or consolidation. No inspissated secretions or endobronchial lesion evident. No pleural effusion or pneumothorax. Soft Tissues/Bones: T11 metastatic lesion is again demonstrated with enlarging soft tissue component, series 3, image 10 measuring 34 x 57 mm compared to 30 x 54 mm remeasured at the same level on prior study. No definite new osseous metastatic lesion is evident. Abdomen/Pelvis: Kidneys: The dominant right renal cell mass upper pole right kidney is noticeably larger, axial series 3, image 28 measuring 50 x 56 mm, previously 43 x 50 mm remeasured at the same level. The left kidney appears unremarkable. Other organs: The spleen, pancreas, adrenal glands, liver and gallbladder appear unremarkable. GI/Bowel: Multifocal diverticula involve the descending and sigmoid colon without evidence of acute inflammatory change. No diffuse or focal bowel wall thickening evident. No inflammatory changes evident. No obstruction is seen. The appendix is indiscernible Pelvis: Partially filled urinary bladder appears unremarkable. No pelvic adenopathy or free fluid is seen.  Peritoneum/Retroperitoneum: On axial series 26 is noted along the diaphragmatic duncan, a Celsius 41 x 44 mm mass is previously 27 x 36 mm measured at the same level. A hyperattenuating lymph node measures 15 x 15 mm series 3, image 50 interposed between the superior vena cava and aorta, remeasured on the same level previously 8 x 8 mm. A hyperattenuating lymph node central retroperitoneum adjacent to the aorta and inferior vena cava axial series 3, image 34 measures 17 x 24 mm, remeasured on prior study at 10 mm diameter. Bones/Soft Tissues: No definite new suspicious lytic or blastic lesion. There are old healed left transverse process lumbar fractures. 1. Overall progression of disease at the upper pole right kidney, mass noticeably larger. 2. Overall interval progression of metastatic disease involving T11, the retrocrural region and retroperitoneum. Ct Lumbar Spine Wo Contrast    Result Date: 5/17/2019  EXAMINATION: CT OF THE LUMBAR SPINE WITHOUT CONTRAST,  5/17/2019 TECHNIQUE: CT of the lumbar spine was performed without the administration of intravenous contrast. Multiplanar reformatted images are provided for review. Dose modulation, iterative reconstruction, and/or weight based adjustment of the mA/kV was utilized to reduce the radiation dose to as low as reasonably achievable. COMPARISON: CT abdomen and pelvis April 17, 2019 HISTORY: ORDERING SYSTEM PROVIDED HISTORY: Back pain, after trauma TECHNOLOGIST PROVIDED HISTORY: Reason for exam:->Low back pain Ordering Physician Provided Reason for Exam: Low back pain; Back pain, after trauma. Acuity: Acute Type of Exam: Initial FINDINGS: BONES/ALIGNMENT: No acute fracture. Remote L1-L3 left transverse process fractures. Remote left T12 rib fracture. No subluxation. Destructive changes at T11 are similar to the prior CT. DEGENERATIVE CHANGES: Multilevel degenerative changes. SOFT TISSUES/RETROPERITONEUM: Soft tissue mass surrounds T11. Retroperitoneal adenopathy. No acute findings.  Metastatic disease without osseous destruction of T11 similar to the prior CT. Remote L1-L3 transverse process fractures. Remote T12 rib fracture. Ct Abdomen Pelvis W Iv Contrast Additional Contrast? Oral    Result Date: 4/17/2019  EXAMINATION: CT OF THE ABDOMEN AND PELVIS WITH CONTRAST; CT OF THE CHEST WITH CONTRAST 4/17/2019 2:29 pm TECHNIQUE: CT of the abdomen and pelvis was performed with the administration of intravenous contrast. Multiplanar reformatted images are provided for review. Dose modulation, iterative reconstruction, and/or weight based adjustment of the mA/kV was utilized to reduce the radiation dose to as low as reasonably achievable.; CT of the chest was performed with the administration of intravenous contrast. Multiplanar reformatted images are provided for review. Dose modulation, iterative reconstruction, and/or weight based adjustment of the mA/kV was utilized to reduce the radiation dose to as low as reasonably achievable. COMPARISON: CT chest, abdomen and pelvis 01/31/2018 HISTORY: ORDERING SYSTEM PROVIDED HISTORY: Malignant neoplasm of right kidney, except renal pelvis [facility]) TECHNOLOGIST PROVIDED HISTORY: Additional Contrast?-> oral Ordering Physician Provided Reason for Exam: kidney cancer, pt states recently had to stop chemo due to reactions.; ORDERING SYSTEM PROVIDED HISTORY: Malignant neoplasm of right kidney, except renal pelvis [facility]) TECHNOLOGIST PROVIDED HISTORY: Ordering Physician Provided Reason for Exam: kidney cancer, pt states recently had to stop chemo due to reactions. FINDINGS: Chest: Mediastinum: Cardiac structures and great vessels appear unremarkable. No pericardial effusion. Posterior mediastinal structures appear unremarkable. No mediastinal or hilar adenopathy. Retrocrural lymph node is 21 x 25 mm axial image 17 of the CT abdomen series, previously 13 x 16 mm. Lungs/pleura: Calcified granulomas are noted lateral within the superior segment right lower lobe. The lungs are otherwise clear.   No nodule or consolidation. No inspissated secretions or endobronchial lesion evident. No pleural effusion or pneumothorax. Soft Tissues/Bones: T11 metastatic lesion is again demonstrated with enlarging soft tissue component, series 3, image 10 measuring 34 x 57 mm compared to 30 x 54 mm remeasured at the same level on prior study. No definite new osseous metastatic lesion is evident. Abdomen/Pelvis: Kidneys: The dominant right renal cell mass upper pole right kidney is noticeably larger, axial series 3, image 28 measuring 50 x 56 mm, previously 43 x 50 mm remeasured at the same level. The left kidney appears unremarkable. Other organs: The spleen, pancreas, adrenal glands, liver and gallbladder appear unremarkable. GI/Bowel: Multifocal diverticula involve the descending and sigmoid colon without evidence of acute inflammatory change. No diffuse or focal bowel wall thickening evident. No inflammatory changes evident. No obstruction is seen. The appendix is indiscernible Pelvis: Partially filled urinary bladder appears unremarkable. No pelvic adenopathy or free fluid is seen. Peritoneum/Retroperitoneum: On axial series 26 is noted along the diaphragmatic duncan, a Celsius 41 x 44 mm mass is previously 27 x 36 mm measured at the same level. A hyperattenuating lymph node measures 15 x 15 mm series 3, image 50 interposed between the superior vena cava and aorta, remeasured on the same level previously 8 x 8 mm. A hyperattenuating lymph node central retroperitoneum adjacent to the aorta and inferior vena cava axial series 3, image 34 measures 17 x 24 mm, remeasured on prior study at 10 mm diameter. Bones/Soft Tissues: No definite new suspicious lytic or blastic lesion. There are old healed left transverse process lumbar fractures. 1. Overall progression of disease at the upper pole right kidney, mass noticeably larger.  2. Overall interval progression of metastatic disease involving T11, the retrocrural region and retroperitoneum. Xr Chest Portable    Result Date: 5/17/2019  EXAMINATION: ONE XRAY VIEW OF THE CHEST 5/17/2019 1:27 pm COMPARISON: 02/19/2018 HISTORY: ORDERING SYSTEM PROVIDED HISTORY: fall TECHNOLOGIST PROVIDED HISTORY: Reason for exam:->fall Ordering Physician Provided Reason for Exam: Patient does have cancer and is currently receiving chemo treatments. He also states he took 5-6 pills of oxycodone since 9:30 because he was scared the pain would start so he feels \"Cloudy \" in the head. Acuity: Acute Type of Exam: Initial FINDINGS: No lines or tubes. Normal cardiomediastinal silhouette. The lungs are clear without focal consolidation or pleural effusion. No suspicious pulmonary nodules. No pulmonary edema. No pneumothorax. No acute osseous abnormality. No acute cardiopulmonary disease. Mri Brain W Wo Contrast    Result Date: 5/14/2019  EXAMINATION: MRI OF THE BRAIN WITHOUT AND WITH CONTRAST  5/14/2019 11:54 am TECHNIQUE: Multiplanar multisequence MRI of the head/brain was performed without and with the administration of intravenous contrast. COMPARISON: 01/25/2018. HISTORY: ORDERING SYSTEM PROVIDED HISTORY: Malignant neoplasm of right kidney, except renal pelvis [facility]) TECHNOLOGIST PROVIDED HISTORY: Ordering Physician Provided Reason for Exam: renal ca,  mets Initial evaluation. FINDINGS: INTRACRANIAL STRUCTURES/VENTRICLES:  There is a 4 mm enhancing lesion within the left posterior frontal/parietal lobe with mild surrounding vasogenic edema. No additional abnormal enhancement seen. There is no significant mass effect or midline shift. Areas of T2 FLAIR hyperintensity are seen in the periventricular and subcortical white matter, which are nonspecific, but may represent chronic microvascular ischemic change. There is prominence of the ventricles and sulci due to global parenchymal volume loss. The normal signal voids within the major intracranial vessels appear maintained.   No acute abnormality in the sellar or suprasellar region. No acute infarct. ORBITS: The visualized portion of the orbits demonstrate no acute abnormality. SINUSES: Minimal scattered mucosal thickening of the ethmoid sinuses. The mastoid air cells demonstrate no acute abnormality. BONES/SOFT TISSUES: The bone marrow signal intensity appears normal. The soft tissues demonstrate no acute abnormality. 1. Development of a 4 mm enhancing mass within the left posterior frontal/parietal lobe mild surrounding vasogenic edema. No significant mass effect or midline shift. 2. Otherwise, no acute intracranial abnormality. No acute infarct. 3. Mild global parenchymal volume loss with chronic microvascular ischemic changes. These results were sent to the [employer] [id number] ([address] [place] [address]) on 5/14/2019 at 2:17 pm to be communicated to the referring/covering health care provider/office.      EKG this visit:  Reviewed           Electronically signed by DAVIS Huff CNP on 5/17/2019 at 3:22 PM

## 2019-05-17 NOTE — CONSULTS
[facility]  Consult Note    2019  6:18 PM    Patient:    Mckenzie Gaming  : 1955   58 y.o. MRN: [id number]  Admitted: 2019  1:01 PM ATT: Woodrow Villatoro MD   [id number]  AdmitDx: Fall [W19. XXXA]  History of cancer [Z85.9]  Fall, initial encounter [W19. XXXA]  Closed fracture of transverse process of thoracic vertebra, initial encounter ([facility] [address] [S22.009A]  Fatigue, unspecified type [R53.83]  Leukocytosis, unspecified type [X23.838]  PCP: DAVIS Linda - CNP    Reason for Consult:  FTT    Requesting Physician:  Woodrow Villatoro MD      History Obtained From:  Patient and review of all records    HISTORY OF PRESENT ILLNESS:      Background history:  ---------------------------  18:Very pleasant 60 YOWM arrived alone to the clinic. Reported that he was being evaluated for SOB on minimal exertion by cardiology and Pulmonology. Was recommended Pacemaker placement. he also reported rt sided flank pain , 10/10 at its worst, stabbing in nature, no radiation, relieves with aleve, denied any nausea or emesis, denied any increased frequency of urination, denied any hematuria, denied any abdominal pain. This pain has been occurring for the past 7 months. Denied any chest pain. Reported occasional HA and blurred vision lately. Denied any focal weakness or any tingling or numbness. Reported poor appetite and weight loss of about 30 lbs over the past year, out of which 10 lbs in the last month  17 CXR with scattered calcified granulomas  11/3/17 CXR with chronic calcified rt mid lung granulomas  17: CT chest 3mm calcified nodules superior  segment rt lower lobe. Rt upper pole renal mass 5.3 x 7.3 cm. accompanying adjacent 3.2 cm paraaortic LN, lytic T11 lesion  18: Bone scan with subtle visualization of the T11 vertebra  18:CT abdomen: 6.6 cm rt renal lesion. Rt retroperitoneal and rt retrocrural metastatic adenopathy.  T11 lytic lesion 2.8 cm   18: T11 biopsy  with metastatic Emphysema. Cabozantinib held for two weeks 4/2019      4/15/19: Ct chest/abdomen and pelvis:Chest:  Mediastinum: Cardiac structures and great vessels appear unremarkable. No  pericardial effusion. Posterior mediastinal structures appear unremarkable. No mediastinal or hilar adenopathy. Retrocrural lymph node is 21 x 25 mm  axial image 17 of the CT abdomen series, previously 13 x 16 mm. Lungs/pleura: Calcified granulomas are noted lateral within the superior  segment right lower lobe. The lungs are otherwise clear. No nodule or  consolidation. No inspissated secretions or endobronchial lesion evident. No pleural effusion or pneumothorax. Soft Tissues/Bones: T11 metastatic lesion is again demonstrated with  enlarging soft tissue component, series 3, image 10 measuring 34 x 57 mm  compared to 30 x 54 mm remeasured at the same level on prior study. No  definite new osseous metastatic lesion is evident. Abdomen/Pelvis:  Kidneys: The dominant right renal cell mass upper pole right kidney is  noticeably larger, axial series 3, image 28 measuring 50 x 56 mm, previously  43 x 50 mm remeasured at the same level. The left kidney appears  unremarkable. Other organs: The spleen, pancreas, adrenal glands, liver and gallbladder  appear unremarkable. GI/Bowel: Multifocal diverticula involve the descending and sigmoid colon  without evidence of acute inflammatory change. No diffuse or focal bowel  wall thickening evident. No inflammatory changes evident. No obstruction is  seen. The appendix is indiscernible  Pelvis: Partially filled urinary bladder appears unremarkable. No pelvic  adenopathy or free fluid is seen. Peritoneum/Retroperitoneum: On axial series 26 is noted along the  diaphragmatic duncan, a Celsius 41 x 44 mm mass is previously 27 x 36 mm  measured at the same level.  A hyperattenuating lymph node measures 15 x 15  mm series 3, image 50 interposed between the superior vena cava and aorta,  remeasured on the same level previously 8 x 8 mm. A hyperattenuating lymph  node central retroperitoneum adjacent to the aorta and inferior vena cava  axial series 3, image 34 measures 17 x 24 mm, remeasured on prior study at 10  mm diameter. Bones/Soft Tissues: No definite new suspicious lytic or blastic lesion. There are old healed left transverse process lumbar fractures. IMPRESSION:  1. Overall progression of disease at the upper pole right kidney, mass  noticeably larger. 2. Overall interval progression of metastatic disease involving T11, the  retrocrural region and retroperitoneum. 5/8/19: MRI brain:IMPRESSION:  1. Development of a 4 mm enhancing mass within the left posterior  frontal/parietal lobe mild surrounding vasogenic edema. No significant mass  effect or midline shift. 2. Otherwise, no acute intracranial abnormality. No acute infarct. 3. Mild global parenchymal volume loss with chronic microvascular ischemic  Changes. Saw him in the office 5/15/19: Discussed findings and recommended hospice/BSC but wanted to proceed with immunotherapy. 5/17/19:Reported that he continues to feel very very weak. Reported poor appetite and continues to lose weight. Fell several times. Reported that continues to have pain in the right flank area. Denied any overt bleeding. Denied any chest pain, increased sob, palpitations or any dizziness. Denied any abdominal pain, nausea, emesis, diarrhea. No Gu sx     5/17/19: Ct LS:  No acute findings.       Metastatic disease without osseous destruction of T11 similar to the prior CT.       Remote L1-L3 transverse process fractures.  Remote T12 rib fracture.        Past Medical History:        Diagnosis Date    Anxiety     Back problem     \"Lower Back Hurts Sometimes\"    Bipolar disorder (HCC)     Bradycardia     per old chart( see notes Dr Desiree Hernadez 11/2017) pt had bradycardia on EKG - lexiscan showed ischemia ant wall- was scheduled for cath- no show- then noted consult with  [facility] Pacer 12/1/2017 in old chart- pt to consider having pacemaker insertion( per pt on 1/25/2018-\"have not decided if I want this done or not\"    Depression     Former smoker 11/29/2017    History of exercise stress test 09/13/2017    treadmill    History of nuclear stress test 09/14/2017    cardiolite-moderate ischemia anterior wall LAD    Hx of echocardiogram 09/14/2017    EF50-55%,mildly dilated left atrium    HX OTHER MEDICAL     [facility] Phys[facility] At [facility]    Lung nodule 11/29/2017    Panic attacks     Right Kidney Cancer     Renal cell carcinoma--with mets to T11 vertebrae    Shortness of breath on exertion     Teeth missing     Upper And Lower    Wears glasses        Past Surgical History:        Procedure Laterality Date    BONE BIOPSY N/A 01/26/2018    Dr Alena Harmon   T-11    CARDIAC CATHETERIZATION  11/06/2017    Dr. Danish Montgomery Right 06/24/14         Current Medications:    Medications    Scheduled Medications:    sodium chloride flush  10 mL Intravenous 2 times per day    enoxaparin  40 mg Subcutaneous Daily    [START ON 5/18/2019] sertraline  100 mg Oral Daily    dronabinol  5 mg Oral BID WC    [START ON 5/18/2019] DULoxetine  30 mg Oral Daily    mirtazapine  7.5 mg Oral Nightly     PRN Medications: ondansetron, sodium chloride flush, magnesium hydroxide, ondansetron, acetaminophen, oxyCODONE      Allergies:  Patient has no known allergies. Social History:   TOBACCO:   reports that he quit smoking about 26 years ago. His smoking use included cigarettes. He has a 21.00 pack-year smoking history. He has never used smokeless tobacco.  ETOH:   reports that he does not drink alcohol.     Family History:       Problem Relation Age of Onset    Heart Attack Father     High Cholesterol Father     Cancer Father         Skin Cancer    Heart Disease Father         Heart Attack    Early Death Brother 58        Throat Cancer    Cancer Brother         Throat Cancer    Other Sister         Rheumatic Fever, Pituitary Gland Problems    Other Brother         Overweight, Back Problems         REVIEW OF SYSTEMS:    Per HPI    PHYSICAL EXAM:      Vitals:    /76   Pulse 70   Temp 97.6 °F (36.4 °C) (Oral)   Resp 16   Ht 6' 2\" (1.88 m)   Wt 138 lb (62.6 kg)   SpO2 97%   BMI 17.72 kg/m²   CONSTITUTIONAL: thinly built, frail, ill and tired appearing  EYES: JOHN, EOMI. No palor  NECK: no lad  HEMATOLOGIC/LYMPHATIC: no cervical, supraclavicular or axillary lymphadenopathy   LUNGS:poor effort CTAB, no wheeze  CARDIOVASCULAR:s1s2 irr irr  ABDOMEN: soft,  bs pos, no hsm, ttp right flank  NEUROLOGIC: awake, alert, oriented to name, place and time. Motor skills grossly intact. SKIN:Multiple diffuse ecchymosis especially on the upper extremities and the lower extremities. EXTREMITIES: no LE edema bilaterally    DATA:    ABGs: No results found for: PHART, PO2ART, BFZ2BDF  CBC:   Recent Labs     05/17/19  1307   WBC 19.2*   HGB 11.3*   *     BMP:    Recent Labs     05/17/19  1307      K 3.9   CL 96*   CO2 27   BUN 9   CREATININE 0.4*   GLUCOSE 104*     Magnesium:   Lab Results   Component Value Date    MG 2.0 05/17/2019     Hepatic:   Recent Labs     05/17/19  1307   AST 14*   ALT 13   BILITOT 0.4   ALKPHOS 132*     Recent Labs     05/17/19  1307   LIPASE 9*     No results for input(s): PROTIME, INR in the last 72 hours. No results for input(s): PTT in the last 72 hours. Lipids: No results for input(s): CHOL, HDL in the last 72 hours. Invalid input(s): LDLCALCU  INR: No results for input(s): INR in the last 72 hours. TSH: No results found for: TSH  No intake or output data in the 24 hours ending 05/17/19 1818   sodium chloride 75 mL/hr at 05/17/19 1743     RCC: on immunotherapy. Poor prognosis. FTT/falls: consult palliative care. Poor prognosis. Will discuss hospice care again. Brain lesion: small.  Discussed with radiation oncology. No recs for XRT at this point. Placed him on decadron. Esophageal thickening: No dysphagia: Discussed further GI eval, but he defered    Dysgeusia/weight loss:Is on drabarinol and dexamethasone    leukocytosis: Mild, Most probably sec to steroids. No signs or symptoms of underlying infection. anemia: sec to malignancy. Transfusional support as needed    thrombocytosis: Reactive sec to pain/malignancy    Rt Flank pain: probably sec to the rt renal mass, currently controlled with increased analgesic regimen. Appropriate analgesic and bowel regimen. Coagulopathy:PTT elevated. W/U in the past consistent with LA  No factor def as per labs so far and no h/o bleeding. No recommendations for any AC at this point as no evidence of TE in the past    Dizziness/Hypotension: Sec to poor po intake. IVF and encourage po intake. Discussed the findings and plan with the pt. He verbalized understanding.     ERLINDA Nelson MD  5/17/2019  6:18 PM

## 2019-05-17 NOTE — ED PROVIDER NOTES
[facility]      TRIAGE CHIEF COMPLAINT:   Fall and Fatigue      Lime:  Vito ARENAS Rafaela Gonzalez is a 58 y.o. male that presents with complaint of frequent falls, decreased appetite, weight loss. Has history of cancer stage IV is on chemo, does not qualify for hospice. Lives alone he has been falling more frequently, been fatigued and weak due to decreased appetite. Family brought him in due to fatigue, weakness, falls. Patient states no current pain no depressions or concerns. Would like to go to health care facility for rehab. No loss of consciousness no head injury no neck pain does complain of low back pain. REVIEW OF SYSTEMS:  At least 10 systems reviewed and otherwise acutely negative except as in the [address] Review of Systems   Constitutional: Positive for activity change, appetite change and fatigue. HENT: Negative. Negative for congestion. Eyes: Negative. Respiratory: Negative. Cardiovascular: Negative. Gastrointestinal: Negative. Endocrine: Negative. Genitourinary: Negative. Musculoskeletal: Positive for back pain and myalgias. Skin: Positive for pallor. Allergic/Immunologic: Negative. Neurological: Positive for weakness and light-headedness. Hematological: Negative. Psychiatric/Behavioral: Negative. All other systems reviewed and are negative.       Past Medical History:   Diagnosis Date    Anxiety     Back problem     \"Lower Back Hurts Sometimes\"    Bipolar disorder ([facility] [address]     Bradycardia     per old chart( see notes Dr Pancho Zarco 11/2017) pt had bradycardia on EKG - lexiscan showed ischemia ant wall- was scheduled for cath- no show- then noted consult with Dr Yara Tanner 12/1/2017 in old chart- pt to consider having pacemaker insertion( per pt on 1/25/2018-\"have not decided if I want this done or not\"    Depression     Former smoker 11/29/2017    History of exercise stress test 09/13/2017    treadmill    History of nuclear stress test 09/14/2017 cardiolite-moderate ischemia anterior wall LAD    Hx of echocardiogram 2017    EF50-55%,mildly dilated left atrium    HX OTHER MEDICAL     [facility]    Lung nodule 2017    Panic attacks     Right Kidney Cancer     Renal cell carcinoma--with mets to T11 vertebrae    Shortness of breath on exertion     Teeth missing     Upper And Lower    Wears glasses      Past Surgical History:   Procedure Laterality Date    BONE BIOPSY N/A 2018    Dr Sheryl Lara   T-11    CARDIAC CATHETERIZATION  2017    Dr. Neva Smith Right 14     Family History   Problem Relation Age of Onset    Heart Attack Father     High Cholesterol Father     Cancer Father         Skin Cancer    Heart Disease Father         Heart Attack    Early Death Brother 58        Throat Cancer    Cancer Brother         Throat Cancer    Other Sister         Rheumatic Fever, Pituitary Gland Problems    Other Brother         Overweight, Back Problems     Social History     Socioeconomic History    Marital status: Single     Spouse name: Not on file    Number of children: Not on file    Years of education: Not on file    Highest education level: Not on file   Occupational History    Not on file   Social Needs    Financial resource strain: Not on file    Food insecurity:     Worry: Not on file     Inability: Not on file    Transportation needs:     Medical: Not on file     Non-medical: Not on file   Tobacco Use    Smoking status: Former Smoker     Packs/day: 1.00     Years: 21.00     Pack years: 21.00     Types: Cigarettes     Last attempt to quit: 1992     Years since quittin.4    Smokeless tobacco: Never Used   Substance and Sexual Activity    Alcohol use: No     Comment: \"Quit , Was Occ\"\"use to drink every day- average 6-8 per day\"    Drug use: No     Comment: \"when younger- last used age 45's\"    Sexual activity: Not Currently   Lifestyle    Physical activity:     Days per week: Not on file     Minutes per session: Not on file    Stress: Not on file   Relationships    Social connections:     Talks on phone: Not on file     Gets together: Not on file     Attends Jehovah's witness service: Not on file     Active member of club or organization: Not on file     Attends meetings of clubs or organizations: Not on file     Relationship status: Not on file    Intimate partner violence:     Fear of current or ex partner: Not on file     Emotionally abused: Not on file     Physically abused: Not on file     Forced sexual activity: Not on file   Other Topics Concern    Not on file   Social History Narrative    ** Merged History Encounter **          Current Facility-Administered Medications   Medication Dose Route Frequency Provider Last Rate Last Dose    ondansetron (ZOFRAN) injection 4 mg  4 mg Intravenous Q30 Min PRN Lamond Dura, DO        0.9 % sodium chloride infusion   Intravenous Continuous Manju Tolbert APRN - CNP        sodium chloride flush 0.9 % injection 10 mL  10 mL Intravenous 2 times per day Manju Tolbert APRN - CNP        sodium chloride flush 0.9 % injection 10 mL  10 mL Intravenous PRN Manju Tolbert APRN - CNP        magnesium hydroxide (MILK OF MAGNESIA) 400 MG/5ML suspension 30 mL  30 mL Oral Daily PRN Manju Tolbert APRN - CNP        ondansetron (ZOFRAN) injection 4 mg  4 mg Intravenous Q6H PRN Manju Tolbert APRN - CNP        enoxaparin (LOVENOX) injection 40 mg  40 mg Subcutaneous Daily Manju Tolbert APRN - CNP        acetaminophen (TYLENOL) tablet 650 mg  650 mg Oral Q4H PRN Manju Tolbert APRN - CNP          No Known Allergies  Current Facility-Administered Medications   Medication Dose Route Frequency Provider Last Rate Last Dose    ondansetron (ZOFRAN) injection 4 mg  4 mg Intravenous Q30 Min PRN Lamond Dura, DO        0.9 % sodium chloride infusion   Intravenous Continuous Manju Tolbert APRN - CNP        sodium chloride flush 0.9 % injection 10 mL  10 mL Intravenous 2 times per day Cheri Nina APRN - CNP        sodium chloride flush 0.9 % injection 10 mL  10 mL Intravenous PRN DAVIS Campbell - CNP        magnesium hydroxide (MILK OF MAGNESIA) 400 MG/5ML suspension 30 mL  30 mL Oral Daily PRN Cheri Nina APRN - [facility]        ondansetron [facility]) injection 4 mg  4 mg Intravenous Q6H PRN Cheri Nina APRN - [facility]        enoxaparin (LOVENOX) injection 40 mg  40 mg Subcutaneous Daily Cheri Nina APRN - [facility]        acetaminophen (TYLENOL) tablet 650 mg  650 mg Oral Q4H PRN DAVIS Campbell - CNP           Nursing Notes Reviewed    VITAL SIGNS:  ED Triage Vitals   Enc Vitals Group      BP       Pulse       Resp       Temp       Temp src       SpO2       Weight       Height       Head Circumference       Peak Flow       Pain Score       Pain Loc       Pain Edu? Excl. in [address] PHYSICAL EXAM:  Physical Exam   Constitutional: He is oriented to person, place, and time. He appears well-developed. He appears cachectic. He is active and cooperative. Non-toxic appearance. He does not have a sickly appearance. He does not appear ill. No distress. HENT:   Head: Normocephalic and atraumatic. Right Ear: External ear normal.   Left Ear: External ear normal.   Mouth/Throat: Oropharynx is clear and moist. No oropharyngeal exudate. Eyes: Pupils are equal, round, and reactive to light. Conjunctivae and EOM are normal. Right eye exhibits no discharge. Left eye exhibits no discharge. No scleral icterus. Neck: Normal range of motion, full passive range of motion without pain and phonation normal. No JVD present. No spinous process tenderness and no muscular tenderness present. No neck rigidity. No edema, no erythema and normal range of motion present. Cardiovascular: Normal rate, regular rhythm, normal heart sounds and intact distal pulses. Exam reveals no gallop and no friction rub.    No murmur heard.  Pulmonary/Chest: Effort normal and breath sounds normal. No stridor. No respiratory distress. He has no wheezes. He has no rales. Abdominal: Soft. Bowel sounds are normal. He exhibits no distension and no mass. There is no tenderness. There is no rigidity, no rebound, no guarding, no tenderness at McBurney's point and negative Moore's sign. Musculoskeletal: Normal range of motion. He exhibits no edema or deformity. Right shoulder: Normal.        Left shoulder: Normal.        Right elbow: Normal.       Left elbow: Normal.        Right wrist: Normal.        Left wrist: Normal.        Right hip: Normal.        Left hip: Normal.        Right knee: Normal.        Left knee: Normal.        Right ankle: Normal.        Left ankle: Normal.        Cervical back: Normal.        Thoracic back: Normal.        Lumbar back: He exhibits tenderness. Neurological: He is alert and oriented to person, place, and time. He has normal strength. He is not disoriented. He displays no atrophy and no tremor. No cranial nerve deficit or sensory deficit. He exhibits normal muscle tone. He displays no seizure activity. Coordination normal. GCS eye subscore is 4. GCS verbal subscore is 5. GCS motor subscore is 6. Skin: Skin is warm. Abrasion noted. No rash noted. He is not diaphoretic. No erythema. There is pallor. Psychiatric: He has a normal mood and affect. His behavior is normal. Judgment and thought content normal.   Nursing note and vitals reviewed.         I have reviewed andinterpreted all of the currently available lab results from this visit (if applicable):    Results for orders placed or performed during the hospital encounter of 05/17/19   CBC Auto Differential   Result Value Ref Range    WBC 19.2 (H) 4.0 - 10.5 K/CU MM    RBC 4.69 4.6 - 6.2 M/CU MM    Hemoglobin 11.3 (L) 13.5 - 18.0 GM/DL    Hematocrit 41.4 (L) 42 - 52 %    MCV 88.3 78 - 100 FL    MCH 24.1 (L) 27 - 31 PG    MCHC 27.3 (L) 32.0 - 36.0 % RDW 19.9 (H) 11.7 - 14.9 %    Platelets 431 (H) 758 - 440 K/CU MM    MPV 8.8 7.5 - 11.1 FL    Myelocytes Relative 1 (H) 0.0 %    Bands Relative 4 (L) 5 - 11 %    Segs Relative 81.0 (H) 36 - 66 %    Lymphocytes % 8.0 (L) 24 - 44 %    Monocytes % 6.0 (H) 0 - 4 %    Myelocytes Absolute 0.19 K/CU MM    Bands Absolute 0.77 K/CU MM    Segs Absolute 15.5 K/CU MM    Lymphocytes # 1.5 K/CU MM    Monocytes # 1.2 K/CU MM    RBC Morphology SLIGHT  MACROCYTES       Differential Type MANUAL DIFFERENTIAL     Anisocytosis 1+     Polychromasia 1+    CMP   Result Value Ref Range    Sodium 136 135 - 145 MMOL/L    Potassium 3.9 3.5 - 5.1 MMOL/L    Chloride 96 (L) 99 - 110 mMol/L    CO2 27 21 - 32 MMOL/L    BUN 9 6 - 23 MG/DL    CREATININE 0.4 (L) 0.9 - 1.3 MG/DL    Glucose 104 (H) 70 - 99 MG/DL    Calcium 10.4 8.3 - 10.6 MG/DL    Alb 1.7 (L) 3.4 - 5.0 GM/DL    Total Protein 6.7 6.4 - 8.2 GM/DL    Total Bilirubin 0.4 0.0 - 1.0 MG/DL    ALT 13 10 - 40 U/L    AST 14 (L) 15 - 37 IU/L    Alkaline Phosphatase 132 (H) 40 - 129 IU/L    GFR Non-African American >60 >60 mL/min/1.73m2    GFR African American >60 >60 mL/min/1.73m2    Anion Gap 13 4 - 16   TSH without Reflex   Result Value Ref Range    TSH, High Sensitivity 2.750 0.270 - 4.20 uIu/ml   Magnesium   Result Value Ref Range    Magnesium 2.0 1.8 - 2.4 mg/dl   Troponin   Result Value Ref Range    Troponin T <0.010 <0.01 NG/ML   CK   Result Value Ref Range    Total CK 45 38 - 174 IU/L   Brain Natriuretic Peptide   Result Value Ref Range    Pro-BNP 1,275 (H) <300 PG/ML   Lipase   Result Value Ref Range    Lipase 9 (L) 13 - 60 IU/L        Radiographs (if obtained):  [] The following radiograph was interpreted by myself in the absence of a radiologist:  [x] Radiologist's Report Reviewed:    CT L spine, CXR, Pelvis    Ct Chest W Contrast    Result Date: 4/17/2019  EXAMINATION: CT OF THE ABDOMEN AND PELVIS WITH CONTRAST; CT OF THE CHEST WITH CONTRAST 4/17/2019 2:29 pm TECHNIQUE: CT of the abdomen and pelvis was performed with the administration of intravenous contrast. Multiplanar reformatted images are provided for review. Dose modulation, iterative reconstruction, and/or weight based adjustment of the mA/kV was utilized to reduce the radiation dose to as low as reasonably achievable.; CT of the chest was performed with the administration of intravenous contrast. Multiplanar reformatted images are provided for review. Dose modulation, iterative reconstruction, and/or weight based adjustment of the mA/kV was utilized to reduce the radiation dose to as low as reasonably achievable. COMPARISON: CT chest, abdomen and pelvis 01/31/2018 HISTORY: ORDERING SYSTEM PROVIDED HISTORY: Malignant neoplasm of right kidney, except renal pelvis [facility]) TECHNOLOGIST PROVIDED HISTORY: Additional Contrast?-> oral Ordering Physician Provided Reason for Exam: kidney cancer, pt states recently had to stop chemo due to reactions.; ORDERING SYSTEM PROVIDED HISTORY: Malignant neoplasm of right kidney, except renal pelvis [facility]) TECHNOLOGIST PROVIDED HISTORY: Ordering Physician Provided Reason for Exam: kidney cancer, pt states recently had to stop chemo due to reactions. FINDINGS: Chest: Mediastinum: Cardiac structures and great vessels appear unremarkable. No pericardial effusion. Posterior mediastinal structures appear unremarkable. No mediastinal or hilar adenopathy. Retrocrural lymph node is 21 x 25 mm axial image 17 of the CT abdomen series, previously 13 x 16 mm. Lungs/pleura: Calcified granulomas are noted lateral within the superior segment right lower lobe. The lungs are otherwise clear. No nodule or consolidation. No inspissated secretions or endobronchial lesion evident. No pleural effusion or pneumothorax.  Soft Tissues/Bones: T11 metastatic lesion is again demonstrated with enlarging soft tissue component, series 3, image 10 measuring 34 x 57 mm compared to 30 x 54 mm remeasured at the same level on prior study. No definite new osseous metastatic lesion is evident. Abdomen/Pelvis: Kidneys: The dominant right renal cell mass upper pole right kidney is noticeably larger, axial series 3, image 28 measuring 50 x 56 mm, previously 43 x 50 mm remeasured at the same level. The left kidney appears unremarkable. Other organs: The spleen, pancreas, adrenal glands, liver and gallbladder appear unremarkable. GI/Bowel: Multifocal diverticula involve the descending and sigmoid colon without evidence of acute inflammatory change. No diffuse or focal bowel wall thickening evident. No inflammatory changes evident. No obstruction is seen. The appendix is indiscernible Pelvis: Partially filled urinary bladder appears unremarkable. No pelvic adenopathy or free fluid is seen. Peritoneum/Retroperitoneum: On axial series 26 is noted along the diaphragmatic duncan, a Celsius 41 x 44 mm mass is previously 27 x 36 mm measured at the same level. A hyperattenuating lymph node measures 15 x 15 mm series 3, image 50 interposed between the superior vena cava and aorta, remeasured on the same level previously 8 x 8 mm. A hyperattenuating lymph node central retroperitoneum adjacent to the aorta and inferior vena cava axial series 3, image 34 measures 17 x 24 mm, remeasured on prior study at 10 mm diameter. Bones/Soft Tissues: No definite new suspicious lytic or blastic lesion. There are old healed left transverse process lumbar fractures. 1. Overall progression of disease at the upper pole right kidney, mass noticeably larger. 2. Overall interval progression of metastatic disease involving T11, the retrocrural region and retroperitoneum.      Ct Abdomen Pelvis W Iv Contrast Additional Contrast? Oral    Result Date: 4/17/2019  EXAMINATION: CT OF THE ABDOMEN AND PELVIS WITH CONTRAST; CT OF THE CHEST WITH CONTRAST 4/17/2019 2:29 pm TECHNIQUE: CT of the abdomen and pelvis was performed with the administration of intravenous contrast. Multiplanar reformatted images are provided for review. Dose modulation, iterative reconstruction, and/or weight based adjustment of the mA/kV was utilized to reduce the radiation dose to as low as reasonably achievable.; CT of the chest was performed with the administration of intravenous contrast. Multiplanar reformatted images are provided for review. Dose modulation, iterative reconstruction, and/or weight based adjustment of the mA/kV was utilized to reduce the radiation dose to as low as reasonably achievable. COMPARISON: CT chest, abdomen and pelvis 01/31/2018 HISTORY: ORDERING SYSTEM PROVIDED HISTORY: Malignant neoplasm of right kidney, except renal pelvis [facility]) TECHNOLOGIST PROVIDED HISTORY: Additional Contrast?-> oral Ordering Physician Provided Reason for Exam: kidney cancer, pt states recently had to stop chemo due to reactions.; ORDERING SYSTEM PROVIDED HISTORY: Malignant neoplasm of right kidney, except renal pelvis [facility]) TECHNOLOGIST PROVIDED HISTORY: Ordering Physician Provided Reason for Exam: kidney cancer, pt states recently had to stop chemo due to reactions. FINDINGS: Chest: Mediastinum: Cardiac structures and great vessels appear unremarkable. No pericardial effusion. Posterior mediastinal structures appear unremarkable. No mediastinal or hilar adenopathy. Retrocrural lymph node is 21 x 25 mm axial image 17 of the CT abdomen series, previously 13 x 16 mm. Lungs/pleura: Calcified granulomas are noted lateral within the superior segment right lower lobe. The lungs are otherwise clear. No nodule or consolidation. No inspissated secretions or endobronchial lesion evident. No pleural effusion or pneumothorax. Soft Tissues/Bones: T11 metastatic lesion is again demonstrated with enlarging soft tissue component, series 3, image 10 measuring 34 x 57 mm compared to 30 x 54 mm remeasured at the same level on prior study. No definite new osseous metastatic lesion is evident. Abdomen/Pelvis: Kidneys:  The dominant right renal cell mass upper pole right kidney is noticeably larger, axial series 3, image 28 measuring 50 x 56 mm, previously 43 x 50 mm remeasured at the same level. The left kidney appears unremarkable. Other organs: The spleen, pancreas, adrenal glands, liver and gallbladder appear unremarkable. GI/Bowel: Multifocal diverticula involve the descending and sigmoid colon without evidence of acute inflammatory change. No diffuse or focal bowel wall thickening evident. No inflammatory changes evident. No obstruction is seen. The appendix is indiscernible Pelvis: Partially filled urinary bladder appears unremarkable. No pelvic adenopathy or free fluid is seen. Peritoneum/Retroperitoneum: On axial series 26 is noted along the diaphragmatic duncan, a Celsius 41 x 44 mm mass is previously 27 x 36 mm measured at the same level. A hyperattenuating lymph node measures 15 x 15 mm series 3, image 50 interposed between the superior vena cava and aorta, remeasured on the same level previously 8 x 8 mm. A hyperattenuating lymph node central retroperitoneum adjacent to the aorta and inferior vena cava axial series 3, image 34 measures 17 x 24 mm, remeasured on prior study at 10 mm diameter. Bones/Soft Tissues: No definite new suspicious lytic or blastic lesion. There are old healed left transverse process lumbar fractures. 1. Overall progression of disease at the upper pole right kidney, mass noticeably larger. 2. Overall interval progression of metastatic disease involving T11, the retrocrural region and retroperitoneum. Mri Brain W Wo Contrast    Result Date: 5/14/2019  EXAMINATION: MRI OF THE BRAIN WITHOUT AND WITH CONTRAST  5/14/2019 11:54 am TECHNIQUE: Multiplanar multisequence MRI of the head/brain was performed without and with the administration of intravenous contrast. COMPARISON: 01/25/2018.  HISTORY: ORDERING SYSTEM PROVIDED HISTORY: Malignant neoplasm of right kidney, except renal pelvis ([address] TECHNOLOGIST PROVIDED HISTORY: Ordering Physician Provided Reason for Exam: renal ca,  mets Initial evaluation. FINDINGS: INTRACRANIAL STRUCTURES/VENTRICLES:  There is a 4 mm enhancing lesion within the left posterior frontal/parietal lobe with mild surrounding vasogenic edema. No additional abnormal enhancement seen. There is no significant mass effect or midline shift. Areas of T2 FLAIR hyperintensity are seen in the periventricular and subcortical white matter, which are nonspecific, but may represent chronic microvascular ischemic change. There is prominence of the ventricles and sulci due to global parenchymal volume loss. The normal signal voids within the major intracranial vessels appear maintained. No acute abnormality in the sellar or suprasellar region. No acute infarct. ORBITS: The visualized portion of the orbits demonstrate no acute abnormality. SINUSES: Minimal scattered mucosal thickening of the ethmoid sinuses. The mastoid air cells demonstrate no acute abnormality. BONES/SOFT TISSUES: The bone marrow signal intensity appears normal. The soft tissues demonstrate no acute abnormality. 1. Development of a 4 mm enhancing mass within the left posterior frontal/parietal lobe mild surrounding vasogenic edema. No significant mass effect or midline shift. 2. Otherwise, no acute intracranial abnormality. No acute infarct. 3. Mild global parenchymal volume loss with chronic microvascular ischemic changes. These results were sent to the [facility] [address] ([address] [place] [address]) on 5/14/2019 at 2:17 pm to be communicated to the referring/covering health care provider/office. EKG (if obtained): (All EKG's are interpreted by myself in the absence of a cardiologist)    MDM:    Patient here with frequent falls, history of cancer stage IV, is on chemo he does not qualify for hospice tenderness.   He lives alone he's not been eating or drinking well he has been falling more freely he did not pass out he did not hit his head. Patient given fluids medication no acute lab abnormalities with an elevated white count awaiting urine. Case management consult imaging shows rib fracture, transverse process fracture. I did talk to case management in hospital medicine patient admitted for observation will possibly need feeding tube will need long-term health care. Patient is very cachectic in appearance but otherwise stable.     CLINICAL IMPRESSION:  Final diagnoses:   Fall, initial encounter   Fatigue, unspecified type   History of cancer   Leukocytosis, unspecified type   Closed fracture of transverse process of thoracic vertebra, initial encounter ([facility] [address]       (Please note that portions of this note may have been completed with a voice recognition program. Efforts were made to edit the dictations but occasionally words aremis-transcribed.)    DISPOSITION REFERRAL (if applicable):  Myriam Key, APRN - CNP  [facility], [address] [facility]. [address]  [phone]            DISPOSITION MEDICATIONS (if applicable):  Current Discharge Medication List             Sarah Morgan, [facility],   05/17/19 [id number]

## 2019-05-17 NOTE — ED NOTES
Bed: ED-33  Expected date: 5/17/19  Expected time:   Means of arrival: Car  Comments:  Medic- elderly male, falls       Namrata Cortes  05/17/19 9933

## 2019-05-18 PROBLEM — E43 SEVERE MALNUTRITION (HCC): Status: ACTIVE | Noted: 2019-01-01

## 2019-05-18 NOTE — PROGRESS NOTES
chronic illness  · Etiology: related to Insufficient energy/nutrient consumption, Pain     Signs and symptoms:  as evidenced by Diet history of poor intake, BMI, Severe muscle loss    Objective Information:  · Nutrition-Focused Physical Findings: thin, frail male, underweight, c/o pain and discomfort  · Wound Type: None  · Current Nutrition Therapies:  · Oral Diet Orders: General   · Oral Diet intake: Refusing to eat  · Oral Nutrition Supplement (ONS) Orders: None  · ONS intake: Refused  · Anthropometric Measures:  · Ht: 6' 2\" (188 cm)   · Current Body Wt: 138 lb 0.1 oz (62.6 kg)  · Admission Body Wt: 138 lb 0.1 oz (62.6 kg)  · Usual Body Wt: 175 lb (79.4 kg)(per records )  · % Weight Change:  ,  progressive loss, recent loss ~3% in past month  · Ideal Body Wt: 190 lb (86.2 kg), % Ideal Body 73  · BMI Classification: BMI <18.5 Underweight(BMI-17.8)    Nutrition Interventions:   Continue current diet, Start ONS  Continued Inpatient Monitoring, Education not appropriate at this time, Coordination of Care    Nutrition Evaluation:   · Evaluation: Goals set   · Goals: Patient will tolerate diet to consume at least 50% at meals     · Monitoring: Meal Intake, Supplement Intake, Diet Tolerance, Weight, Pertinent Labs      Electronically signed by Barb Walker RD, LD on 5/18/19 at 10:48 AM    Contact Number: [phone]

## 2019-05-18 NOTE — PROGRESS NOTES
HOSPITALIST PROGRESS NOTE  Date: 5/18/2019   Name: Siena Smith   MRN: [id number]   YOB: 1955      Subjective/Interval Hx:   Patient states that he is doing fine but he feels very weak states that he is unable to stand without falling have ordered PT OT for evaluation and possible rehab posthospitalization. Objective:   Physical Exam:   BP (!) 109/59   Pulse 71   Temp 98 °F (36.7 °C) (Oral)   Resp 14   Ht 6' 2\" (1.88 m)   Wt 138 lb (62.6 kg)   SpO2 94%   BMI 17.72 kg/m²   General: no acute distress, well nourished and well hydrated  HEENT: NCAT  Heart: S1S2 RRR  Lungs: Clear to ascultation bilaterally, respiratory effort normal  Abdomen: soft, NT/ND, positive bowel sounds  Extremities: no pitting edema, nontender   Neuro: patient is awake, alert and orientated times 3, no gross deficits  Skin: no rashes or ecchymosis        Meds:   Meds:    sodium chloride flush  10 mL Intravenous 2 times per day    enoxaparin  40 mg Subcutaneous Daily    sertraline  100 mg Oral Daily    dronabinol  5 mg Oral BID WC    DULoxetine  30 mg Oral Daily    mirtazapine  7.5 mg Oral Nightly      Infusions:    sodium chloride 75 mL/hr at 05/18/19 0707     PRN Meds:   ondansetron 4 mg Q30 Min PRN   sodium chloride flush 10 mL PRN   magnesium hydroxide 30 mL Daily PRN   ondansetron 4 mg Q6H PRN   acetaminophen 650 mg Q4H PRN   oxyCODONE 5 mg Q4H PRN       Data/Labs:     Recent Labs     05/17/19  1307 05/18/19  0146   WBC 19.2* 16.6*   HGB 11.3* 10.3*   HCT 41.4* 37.9*   * 381      Recent Labs     05/17/19  1307 05/18/19  0146    138   K 3.9 3.7   CL 96* 97*   CO2 27 27   BUN 9 9   CREATININE 0.4* 0.4*     Recent Labs     05/17/19  1307 05/18/19  0146   AST 14* 9*   ALT 13 10   BILITOT 0.4 0.4   ALKPHOS 132* 117     No results for input(s): INR in the last 72 hours. Recent Labs     05/17/19  1307   CKTOTAL 39   TROPONINT <0.010       I/O last 3 completed shifts:   In: 600 [P.O.:600]  Out: 600

## 2019-05-18 NOTE — PLAN OF CARE
Nutrition Problem: Severe malnutrition, In context of chronic illness  Intervention: Food and/or Nutrient Delivery: Continue current diet, Start ONS  Nutritional Goals: Patient will tolerate diet to consume at least 50% at meals

## 2019-05-18 NOTE — CARE COORDINATION
LSW was consulted to assist this pt with d/c planning. Pt to ED for falls and being weak. Pt is cancer pt and was @ chemotherapy today. Pt was referred to ED for evaluation and possible admission for prg-tube placement. LSW to pt's room and pts sister @ bedside. Pt noted ok to speak openly. Pt's sister reports pt has been dealing w/cancer for over a year and recently dx w/cancer in brain. Pt reports last 2 weeks he has not had an appettite. Pt lives by self and has 3-4 steps to get in house. Pt has difficulty ambulating and getting into car to get to treatment today. Pt has insurance and can afford his medications. Pt has a walker and cane @ home but they are borrowed and do not work well; he probably needs new ones- his own. Pt does not drive and cannot prepare meals @ this time. Pt had hospice but it was d/c'd d/t pt wanting to receive treatment. Pt's insurance will not pay for hospice and cancer treatments. Discussed HHC and SNF options. Pt noted although he would like to return home he acknowledged need for care and rehab @ a SNF. Once he gets his strength up, then he can return home. Pt was given list of SNF's who take his insurance. Discussed this information w/Dr. Sanjiv HOOD to admit pt for SNF placement.

## 2019-05-18 NOTE — PROGRESS NOTES
Physical Therapy    Facility/Department: [facility] 4E  Initial Assessment    NAME: Will Ramírez  : 1955  MRN: [id number]    Date of Service: 2019    Discharge Recommendations:  Subacute/Skilled Nursing Facility        Assessment   Assessment: Pt is a 58 y.o. Female with medical history, surgical history, co-morbidities, and personal factors including Anxiety, Back problem, Bipolar disorder, Bradycardia, Depression, Former smoker, History of exercise stress test, History of nuclear stress test, Hx of echocardiogram, Lung nodule, Panic attacks, Right Kidney Cancer, Shortness of breath on exertion, bone biopsy (N/A, 2018); Cardiac catheterization (2017); and Inguinal hernia repair (Right, 14) with admission for Fall, Fatigue, History of cancer, Leukocytosis, and Closed fracture of transverse process of L1-L3. Prior to admission, pt was modified independent with functional mobility and ADLs. Examination of body systems reveals decreased strength, decreased balance, decreased aerobic capacity, and decreased independence with functional mobility.         Prognosis: Fair  Decision Making: High Complexity  Clinical Presentation: unpredictable characteristics  REQUIRES PT FOLLOW UP: Yes  Activity Tolerance  Activity Tolerance: Patient Tolerated treatment well          Restrictions  Restrictions/Precautions  Restrictions/Precautions: General Precautions, Fall Risk  Vision/Hearing  Vision: Impaired(unable to read board but able to identify number of fingers therapist is holding up from ~10 feet away)  Hearing: Within functional limits     Subjective  General  Chart Reviewed: Yes  Patient assessed for rehabilitation services?: Yes  Family / Caregiver Present: No  Follows Commands: Within Functional Limits  Pain Screening  Patient Currently in Pain: Yes  Pain Assessment  Pain Assessment: Faces  Mo-Baker Pain Rating: Hurts little more  Pain Location: Generalized  Vital Signs  Patient Currently in Pain: Yes       Orientation  Orientation  Overall Orientation Status: Impaired  Orientation Level: Disoriented to time;Oriented to place;Oriented to person  Social/Functional History  Social/Functional History  Lives With: Alone  Type of Home: House  Home Layout: One level  Home Access: Stairs to enter with rails  Entrance Stairs - Number of Steps: 3  Bathroom Shower/Tub: Tub/Shower unit  Home Equipment: Rolling walker, Cane  ADL Assistance: Independent  Ambulation Assistance: Independent(mod I with spc)  Transfer Assistance: Independent  Cognition   Cognition  Overall Cognitive Status: Exceptions  Arousal/Alertness: Appropriate responses to stimuli  Following Commands: Follows all commands without difficulty  Attention Span: Appears intact  Memory: Decreased short term memory;Decreased long term memory  Safety Judgement: Decreased awareness of need for safety;Decreased awareness of need for assistance  Problem Solving: Decreased awareness of errors  Insights: Decreased awareness of deficits  Initiation: Requires cues for some  Sequencing: Requires cues for some    Objective             Strength RLE  Comment: knee extension: 4-/5, ankle DF: 4-/5  Strength LLE  Comment: knee extension: 4-/5, ankle DF: 4-/5     Sensation  Overall Sensation Status: WFL  Bed mobility  Supine to Sit: Minimal assistance(with verbal and tactile cues for BUE and BLE placement throughout transfer; with HOB elevated; with increased time for task completion)  Sit to Supine: Moderate assistance  Scooting:  Moderate assistance           Balance  Posture: Poor(forward head, rounded shoulders, increased thoracic kyphosis)  Sitting - Static: Good  Sitting - Dynamic: Fair  Comments: patient requesting to return to supine due to fatigue during sitting balance activity  Exercises  Comments: patient completed 1 set of 10 reps of BLE ankle pumps and quad sets with tactile cues for quadriceps muscle activation   Therapeutic Activity Training:   Therapeutic

## 2019-05-18 NOTE — PROGRESS NOTES
Approached pt for daily hygiene. Pt refused bath and stated that he feels too weak to bath and eat today. Will offer daily hygiene needs later in the shift.

## 2019-05-19 NOTE — CARE COORDINATION
CM followed up with pt regarding his decision for SNF. Pt stated he is in a lot of pain and has not looked at the the list at this time. CM informed RN that pt is in pain and asking for meds. Pt stated he is not sure of his SNF choice at this time. CM will need to follow up with pt regarding his decision.

## 2019-05-19 NOTE — PROGRESS NOTES
HOSPITALIST PROGRESS NOTE  Date: 5/19/2019   Name: Fernandez Rowell   MRN: [id number]   YOB: 1955      Subjective/Interval Hx:   Patient states that he is doing fine but he feels very weak states that he is unable to stand without falling have ordered PT OT for evaluation and possible rehab posthospitalization. Objective:   Physical Exam:   /69   Pulse 72   Temp 99 °F (37.2 °C) (Oral)   Resp 16   Ht 6' 2\" (1.88 m)   Wt 138 lb (62.6 kg)   SpO2 94%   BMI 17.72 kg/m²   General: no acute distress, well nourished and well hydrated  HEENT: NCAT  Heart: S1S2 RRR  Lungs: Clear to ascultation bilaterally, respiratory effort normal  Abdomen: soft, NT/ND, positive bowel sounds  Extremities: no pitting edema, nontender   Neuro: patient is awake, alert and orientated times 3, no gross deficits  Skin: no rashes or ecchymosis        Meds:   Meds:    morphine  30 mg Oral 2 times per day    sodium chloride flush  10 mL Intravenous 2 times per day    enoxaparin  40 mg Subcutaneous Daily    sertraline  100 mg Oral Daily    dronabinol  5 mg Oral BID WC    DULoxetine  30 mg Oral Daily    mirtazapine  7.5 mg Oral Nightly      Infusions:     PRN Meds:     ondansetron 4 mg Q30 Min PRN   sodium chloride flush 10 mL PRN   magnesium hydroxide 30 mL Daily PRN   ondansetron 4 mg Q6H PRN   acetaminophen 650 mg Q4H PRN   oxyCODONE 5 mg Q4H PRN       Data/Labs:     Recent Labs     05/17/19  1307 05/18/19  0146 05/19/19  0604   WBC 19.2* 16.6* 17.2*   HGB 11.3* 10.3* 10.3*   HCT 41.4* 37.9* 38.0*   * 381 371      Recent Labs     05/17/19  1307 05/18/19  0146    138   K 3.9 3.7   CL 96* 97*   CO2 27 27   BUN 9 9   CREATININE 0.4* 0.4*     Recent Labs     05/17/19  1307 05/18/19  0146   AST 14* 9*   ALT 13 10   BILITOT 0.4 0.4   ALKPHOS 132* 117     No results for input(s): INR in the last 72 hours. Recent Labs     05/17/19  1307   CKTOTAL 39   TROPONINT <0.010       I/O last 3 completed shifts:   In: 120 [P.O.:120]  Out: 150 [Urine:150]    Intake/Output Summary (Last 24 hours) at 5/19/2019 1125  Last data filed at 5/18/2019 2059  Gross per 24 hour   Intake 120 ml   Output 150 ml   Net -30 ml        Assessment/Plan:     Ambulatory dysfunction with recurrently falls at home. Admit to med/surg under observation status               PT/OT assessment   05/18/19-patient states that he is just unable to bear his weight on his feet because he is so weak which is causing him to fall frequently.       Metastatic renal cell carcinoma- current chemo regimen              Consult oncology for evaluation.       Generalized weakness/ FTT              IV hydration              Consult dietician       Leukocytosis- nestor 2/2 chronic steroid use              Will evaluation for infectious process    Chronic depression-Cymbalta, Remeron at night, Zoloft daily      DVT Prophylaxis:   Diet: DIET GENERAL;  Dietary Nutrition Supplements: Frozen Oral Supplement  Code Status: Full Code    Dispo: when stable with rehab post hospitalization    Electronically signed by Jose Abdi MD on 5/19/2019 at 11:25 AM

## 2019-05-19 NOTE — PROGRESS NOTES
Pt seen and examined  \"I cant eat anything\"  Very weak and tired    Somnolent  ctab  s1s2   Diffuse ecchymosis    5/17/19: MRI LS:  Impression   No acute findings.       Metastatic disease without osseous destruction of T11 similar to the prior CT.       Remote L1-L3 transverse process fractures.  Remote T12 rib fracture. 5/18/19: Cr 0.4, ca 9.4, alb b2  Wbc 17, Hb 10.3, hct 38, mcv 88.2, plt 371  Pt/ptt pending      RCC: Poor prognosis. On third line systemic therapy/CPI     FTT/falls: consult palliative care. Poor prognosis. ?Plan for SF     Brain lesion: small. Discussed with radiation oncology. No recs for XRT at this point. Placed him on decadron.      Dysgeusia/weight loss:Is on drabarinol and dexamethasone     leukocytosis: Mild, Most probably sec to steroids. No signs or symptoms of underlying infection.      anemia: sec to malignancy. Transfusional support as needed     thrombocytosis: Reactive sec to pain/malignancy     Rt Flank pain: probably sec to the rt renal mass, currently controlled with increased analgesic regimen. Appropriate analgesic and bowel regimen.      Coagulopathy:PTT elevated. W/U in the past consistent with LA  No factor def as per labs so far and no h/o bleeding. No recommendations for any AC at this point as no evidence of TE in the past     Dizziness/Hypotension: Sec to poor po intake. IVF and encourage po intake.     Discussed the findings and plan with the pt.  He verbalized understanding.     ERLINDA

## 2019-05-20 NOTE — PLAN OF CARE
Problem: Falls - Risk of:  Goal: Will remain free from falls  Description  Will remain free from falls  Outcome: Ongoing  Goal: Absence of physical injury  Description  Absence of physical injury  Outcome: Ongoing     Problem: Pain:  Goal: Pain level will decrease  Description  Pain level will decrease  Outcome: Ongoing  Goal: Control of acute pain  Description  Control of acute pain  Outcome: Ongoing  Goal: Control of chronic pain  Description  Control of chronic pain  Outcome: Ongoing     Problem: Nutrition  Goal: Optimal nutrition therapy  Outcome: Ongoing     Problem: Risk for Impaired Skin Integrity  Goal: Tissue integrity - skin and mucous membranes  Description  Structural intactness and normal physiological function of skin and  mucous membranes.   Outcome: Ongoing

## 2019-05-20 NOTE — CARE COORDINATION
Reviewed chart and spoke with pt's sister in -law about discharge needs. She agrees pt will need SNU at discharge. Gave her a PPO SNU list to review with family. She called this CM back and left vm stating they would like [place]. VM /fax to [facility].

## 2019-05-20 NOTE — PROGRESS NOTES
HOSPITALIST PROGRESS NOTE  Date: 5/20/2019   Name: Mack Torres   MRN: [id number]   YOB: 1955      Subjective/Interval Hx:   Patient states that he is doing fine but he feels very weak states that he is unable to stand without falling have ordered PT OT for evaluation and possible rehab posthospitalization. Objective:   Physical Exam:   /68   Pulse 78   Temp 97.5 °F (36.4 °C) (Oral)   Resp 16   Ht 6' 2\" (1.88 m)   Wt 138 lb (62.6 kg)   SpO2 93%   BMI 17.72 kg/m²   General: no acute distress, well nourished and well hydrated  HEENT: NCAT  Heart: S1S2 RRR  Lungs: Clear to ascultation bilaterally, respiratory effort normal  Abdomen: soft, NT/ND, positive bowel sounds  Extremities: no pitting edema, nontender   Neuro: patient is awake, alert and orientated times 3, no gross deficits  Skin: no rashes or ecchymosis        Meds:   Meds:    morphine  30 mg Oral 2 times per day    sodium chloride flush  10 mL Intravenous 2 times per day    enoxaparin  40 mg Subcutaneous Daily    sertraline  100 mg Oral Daily    dronabinol  5 mg Oral BID WC    DULoxetine  30 mg Oral Daily    mirtazapine  7.5 mg Oral Nightly      Infusions:    morphine      sodium chloride       PRN Meds:     ondansetron 4 mg Q30 Min PRN   sodium chloride flush 10 mL PRN   magnesium hydroxide 30 mL Daily PRN   ondansetron 4 mg Q6H PRN   acetaminophen 650 mg Q4H PRN   oxyCODONE 5 mg Q4H PRN       Data/Labs:     Recent Labs     05/18/19  0146 05/19/19  0604 05/20/19  0526   WBC 16.6* 17.2* 15.2*   HGB 10.3* 10.3* 10.2*   HCT 37.9* 38.0* 36.8*    371 360      Recent Labs     05/18/19  0146      K 3.7   CL 97*   CO2 27   BUN 9   CREATININE 0.4*     Recent Labs     05/18/19  0146   AST 9*   ALT 10   BILITOT 0.4   ALKPHOS 117     Recent Labs     05/20/19  0526   INR 1.44     No results for input(s): CKTOTAL, CKMB, CKMBINDEX, TROPONINT in the last 72 hours.     I/O last 3 completed shifts:  In: -   Out: 625 [Urine:625]    Intake/Output Summary (Last 24 hours) at 5/20/2019 1354  Last data filed at 5/20/2019 1200  Gross per 24 hour   Intake 130 ml   Output 225 ml   Net -95 ml        Assessment/Plan:     Ambulatory dysfunction with recurrently falls at home. Admit to med/surg under observation status               PT/OT assessment   05/18/19-patient states that he is just unable to bear his weight on his feet because he is so weak which is causing him to fall frequently. 05/20/19-patient is very weak and has agreed to go to nursing facility for rehab but he wants it and long-term      Metastatic renal cell carcinoma- current chemo regimen              Consult oncology for evaluation.   05/20/19-patient has a poor prognosis is on 3 on his third regimen of chemotherapy, hematology oncology is following for evaluation and treatment       Generalized weakness/ FTT              IV hydration              Consult dietician       Leukocytosis- nestor 2/2 chronic steroid use              Will evaluation for infectious process    Chronic depression-Cymbalta, Remeron at night, Zoloft daily      DVT Prophylaxis:   Diet: DIET GENERAL;  Dietary Nutrition Supplements: Frozen Oral Supplement  Code Status: DNR-CC    Dispo:  stable with rehab post hospitalization    Electronically signed by Pierre Beyer MD on 5/20/2019 at 1:54 PM

## 2019-05-20 NOTE — PROGRESS NOTES
tolerance)  Safety Devices  Safety Devices in place: Yes  Type of devices: Call light within reach; Left in chair;Nurse notified; Chair alarm in place;Gait belt;Patient at risk for falls(sister in law present)           Patient Diagnosis(es): The primary encounter diagnosis was Fall, initial encounter. Diagnoses of Fatigue, unspecified type, History of cancer, Leukocytosis, unspecified type, and Closed fracture of transverse process of thoracic vertebra, initial encounter [facility]) were also pertinent to this visit. has a past medical history of Anxiety, Back problem, Bipolar disorder ([address] Bradycardia, Depression, Former smoker, History of exercise stress test, History of nuclear stress test, Hx of echocardiogram, HX OTHER MEDICAL, Lung nodule, Panic attacks, Right Kidney Cancer, Shortness of breath on exertion, Teeth missing, and Wears glasses. has a past surgical history that includes bone biopsy (N/A, 01/26/2018); Cardiac catheterization (11/06/2017); and Inguinal hernia repair (Right, 06/24/14). Restrictions  Restrictions/Precautions  Restrictions/Precautions: General Precautions, Fall Risk(fall mats)  Position Activity Restriction  Other position/activity restrictions: Cleared by RN, Jacky Ivey, for OT eval.     Subjective   General  Chart Reviewed: Yes  Patient assessed for rehabilitation services?: Yes  Family / Caregiver Present: Yes(sister in law, [place])  Subjective  Subjective: Pt lying in bed. Per RN, needing to go the BR. Pt agreable to services but as starting to get up to EOB, states, \"I feel good right here. \"  Pt agreable to cont getting up w/therapy w/min encouragement to use BR and sit up in chair for breakfast.   Pain Assessment  Pain Assessment: 0-10  Pain Level: 0  Social/Functional History  Social/Functional History  Lives With: (+ sm dog that he expresses desire to return home to)  Type of Home: House  Home Layout: One level  Home Access: Stairs to enter with rails  Entrance Stairs - to correct are poor despite verbal and tactile cueing. Cognition  Arousal/Alertness: Appropriate responses to stimuli  Following Commands: Inconsistently follows commands  Attention Span: Appears intact  Memory: Decreased short term memory;Decreased long term memory;Decreased recall of precautions  Safety Judgement: Decreased awareness of need for safety;Decreased awareness of need for assistance  Problem Solving: Decreased awareness of errors  Insights: Decreased awareness of deficits  Initiation: Requires cues for some  Sequencing: Requires cues for some        Sensation  Overall Sensation Status: Impaired(pt reports tingling maurilio feet)        LUE AROM (degrees)  LUE AROM : WFL  RUE AROM (degrees)  RUE AROM : WFL  LUE Strength  Gross LUE Strength: WFL  RUE Strength  Gross RUE Strength: WFL     Hand Dominance  Hand Dominance: Right             Plan   Plan  Times per week: 2x+  Times per day: Daily  Current Treatment Recommendations: Strengthening, ROM, Balance Training, Functional Mobility Training, Endurance Training, Cognitive Reorientation, Neuromuscular Re-education, Safety Education & Training, Patient/Caregiver Education & Training, Equipment Evaluation, Education, & procurement, Self-Care / ADL    G-Code     OutComes Score                                                  AM-PAC Score  AM-PAC 6 click short form for inpatient daily activity:   How much help from another person does the patient currently need. .. Unable  Dep A Lot  Max A A Lot   Mod A A Little  Min A A Little   CGA  SBA None   Mod I  Indep  Sup   1. Putting on and taking off regular lower body clothing? [] 1    [] 2   [x] 2   [] 3   [] 3   [] 4      2. Bathing (including washing, rinsing, drying)? [] 1   [] 2   [x] 2 [] 3 [] 3 [] 4   3. Toileting, which includes using toilet, bedpan, or urinal? [] 1    [] 2   [] 2   [x] 3   [] 3   [] 4     4. Putting on and taking off regular upper body clothing?  [] 1   [] 2   [] 2   [] 3   [x] 3    [] 4 5. Taking care of personal grooming such as brushing teeth? [] 1   [] 2    [] 2 [x] 3    [] 3   [] 4      6. Eating meals? [] 1   [] 2   [] 2   [] 3   [x] 3   [] 4      Raw Score:  16    [24=0% impaired(CH), 23=1-19%(CI), 20-22=20-39%(CJ), 15-19=40-59%(CK), 10-14=60-79%(CL), 7-9=80-99%(CM), 6=100%(CN)]               Goals  Short term goals  Time Frame for Short term goals: until pt discharged from hospitals or goals met  Short term goal 1: Pt will complete transfers and amb w/RW at [facility] consistently in room for ADL completion. Short term goal 2: Pt will demonstrate good safety awareness and cognition WFL 90% of the time or greater during ADL, ther ex, ther act. Short term goal 3: Pt will complete sp bath v. showering w/use of seat at s/u and SBA UB, CGA LB. Short term goal 4: Pt will complete toileting at SBA. Short term goal 5: Pt will complete resistive UE ther ex at sup w/min vcues to increase act tolerance and strength.         Therapy Time   Individual Concurrent Group Co-treatment   Time In 0845         Time Out 0915         Minutes 30         Timed Code Treatment Minutes: [facility] [place], OT/L

## 2019-05-20 NOTE — CONSULTS
Palliative Medicine Consultation    Reason for Consult:      __X__ Advance Care Planning--DNR-CC now  __X__Transition of Care Planning--wants to return home  __X__ Psychosocial Support--famiy support  __X__ Symptom Management--chronic generalized pain, goals of care    Recommendations:    1. Continue excellent medical supportive management of this unfortunate gentleman with stage IV renal cancer  2. Will start low dose morphine infusion for comfort; he wants to rest  3. I delayed my exam visit until later; his focus is comfort for now--wants to improve his appetite    Requesting Physician:  Judy Nelson and Jean    CHIEF COMPLAINT:  Fall, pain    History Obtained From:  Patient--to limited extent, family member - sister-in-law, electronic medical record    HISTORY OF PRESENT ILLNESS:    Mr. Comfort Vaughan was diagnosed with metastatic renal cancer in January '18; he's currently still getting chemo-therapy and cancer treatment. He lives alone, but has good family support. He was admitted for recent frequent falls, poor appetite, weakness--failure to thrive. Per record, he wants to return home, but recognizes he needs rehab first.  Pain is currently poorly controlled. Sister-in-law notes increasing care needs, poor intake, etc.  I was asked to see patient to assist with goals of care, family support, symptom management.               Past Medical History:        Diagnosis Date    Anxiety     Back problem     \"Lower Back Hurts Sometimes\"    Bipolar disorder (HCC)     Bradycardia     per old chart( see notes Dr Radha Katz 11/2017) pt had bradycardia on EKG - lexiscan showed ischemia ant wall- was scheduled for cath- no show- then noted consult with Dr Mick Palma 12/1/2017 in old chart- pt to consider having pacemaker insertion( per pt on 1/25/2018-\"have not decided if I want this done or not\"    Depression     Former smoker 11/29/2017    History of exercise stress test 09/13/2017    treadmill    History of nuclear stress test 09/14/2017    cardiolite-moderate ischemia anterior wall LAD    Hx of echocardiogram 09/14/2017    EF50-55%,mildly dilated left atrium    HX OTHER MEDICAL     [facility] Physician [facility] At [facility]    Lung nodule 11/29/2017    Panic attacks     Right Kidney Cancer     Renal cell carcinoma--with mets to T11 vertebrae    Shortness of breath on exertion     Teeth missing     Upper And Lower    Wears glasses        Past Surgical History:        Procedure Laterality Date    BONE BIOPSY N/A 01/26/2018    Dr Leti Armas   T-11    CARDIAC CATHETERIZATION  11/06/2017    Dr. Chaim Miranda Right 06/24/14       Current Medications:    Current Facility-Administered Medications: morphine PCA 1 mg/mL, , Intravenous, Continuous  0.9 % sodium chloride infusion, , Intravenous, Continuous  morphine (MS CONTIN) extended release tablet 30 mg, 30 mg, Oral, 2 times per day  ondansetron (ZOFRAN) injection 4 mg, 4 mg, Intravenous, Q30 Min PRN  sodium chloride flush 0.9 % injection 10 mL, 10 mL, Intravenous, 2 times per day  sodium chloride flush 0.9 % injection 10 mL, 10 mL, Intravenous, PRN  magnesium hydroxide (MILK OF MAGNESIA) 400 MG/5ML suspension 30 mL, 30 mL, Oral, Daily PRN  ondansetron (ZOFRAN) injection 4 mg, 4 mg, Intravenous, Q6H PRN  enoxaparin (LOVENOX) injection 40 mg, 40 mg, Subcutaneous, Daily  acetaminophen (TYLENOL) tablet 650 mg, 650 mg, Oral, Q4H PRN  oxyCODONE (ROXICODONE) immediate release tablet 5 mg, 5 mg, Oral, Q4H PRN  sertraline (ZOLOFT) tablet 100 mg, 100 mg, Oral, Daily  dronabinol (MARINOL) capsule 5 mg, 5 mg, Oral, BID WC  DULoxetine (CYMBALTA) extended release capsule 30 mg, 30 mg, Oral, Daily  mirtazapine (REMERON) tablet 7.5 mg, 7.5 mg, Oral, Nightly    Allergies:  Patient has no known allergies.     Social History:    Single, no children, ex-smoker, non-drinker    Family History:       Problem Relation Age of Onset    Heart Attack Father     High Cholesterol Father     Cancer Father         Skin Cancer    Heart Disease Father         Heart Attack    Early Death Brother 58        Throat Cancer    Cancer Brother         Throat Cancer    Other Sister         Rheumatic Fever, Pituitary Gland Problems    Other Brother         Overweight, Back Problems       REVIEW OF SYSTEMS:    He reports no BM in a number of days; all food tastes terrible, he has no appetite    Vitals:    /68   Pulse 78   Temp 97.5 °F (36.4 °C) (Oral)   Resp 16   Ht 6' 2\" (1.88 m)   Wt 138 lb (62.6 kg)   SpO2 93%   BMI 17.72 kg/m²     PHYSICAL EXAM:    I examined a few hours later--morphine has not started yet, but he is receptive to starting it. His sister-in-law has left; no visitors now  Gen:  WD very thin, frail weak appearing white man, very sleepy  HEENT:  Normocephalic, dry MM, good eye contact when they're open  Neck:  No nodes  Heart:  RRR  Lungs:  CTA in front, non-labored  Abdomen:  Soft  Extremities:  No edema, marked muscle wasting    DATA:    Reviewed, addressed elsewhere in chart  IMPRESSION:    1. Unfortunate gentleman with stage IV renal cancer, admitted for falls and failure to thrive, pain  2. Adding low dose morphine infusion for pain; then, will try to address his appetite issues  3.   No BM in a few days; will add Senna tabs daily    I spoke with patient and his sister-in-law  If I can help in any other way, please let me know    I spent 60 minutes total, >50% in counseling and coordinating care    Natalie Martinez D.O., LISE, Palliative Care

## 2019-05-21 PROBLEM — R94.39 ABNORMAL STRESS TEST: Status: RESOLVED | Noted: 2017-09-19 | Resolved: 2019-01-01

## 2019-05-21 PROBLEM — K56.1 INTUSSUSCEPTION (HCC): Status: RESOLVED | Noted: 2018-01-24 | Resolved: 2019-01-01

## 2019-05-21 PROBLEM — C64.1 RENAL CELL CANCER, RIGHT (HCC): Status: ACTIVE | Noted: 2019-01-01

## 2019-05-21 PROBLEM — G89.3 CANCER RELATED PAIN: Status: ACTIVE | Noted: 2019-01-01

## 2019-05-21 PROBLEM — Z51.5 HOSPICE CARE: Chronic | Status: ACTIVE | Noted: 2019-01-01

## 2019-05-21 PROBLEM — C79.51 METASTATIC CANCER TO SPINE (HCC): Chronic | Status: ACTIVE | Noted: 2019-01-01

## 2019-05-21 NOTE — PROGRESS NOTES
Pt seen and examined  Tired and not able to eat  Pain better controlled on PCA pump    Thin, frail, somnolent  ctab  s1s2   Soft bs pos    RCC: Poor prognosis.  Discussed hospice eval and pt agreeable  ERLINDA

## 2019-05-21 NOTE — CARE COORDINATION
Attempted to speak with pt about hospice but he was sleeping. Called pt's sister Pollo Yuan and she states pt has had [facility] in past and would like them again. Called referral to [facility].

## 2019-05-21 NOTE — CARE COORDINATION
Spoke with Skye Logan from [facility] hospice/ plan is to make pt general inpt hospice today- CM available if needs change.

## 2019-05-21 NOTE — H&P
[address] [place] [address]    Date: 5/21/2019  Name: Clemente Cohen  MRN: [id number]  YOB: 1955   Patient's PCP: DAVIS Barbosa CNP   Oncology: Dr Candy Albarado  Referring Physician: Dr Lizabeth Castro care admission date: 5/17 to 5/21/2019   Admit to General Inpatient Hospice: 5/21/2019    Informant: Chart reviewed, discussed with case management, YAEL Quintanilla RN, and I met with the patient at the bedside. There are no family here, but I did phone the patient's sister, Say Benitez at [phone]) with the patient's permission. The patient is lethargic and does not provide much information. CC: weakness, cancer     [place]: This is a 58 y.o. male with Stage IV right renal cell cancer diagnosed in January 2018 with osseous (spinal metastases) and newly found 4 mm left frontoparietal enhancing mass on MRI brain. The patient had been on immunotherapy, and has had progressive decline. The patient was admitted on 5/17/2019 from the cancer center with weakness, falls, failure to thrive. Dr Candy Albarado has requested hospice evaluation. The patient is very weak and lethargic. He has generalized weakness, and had frequent falls prior to admission. The patient lives alone, with help from his sister. He has had increasing care needs and assistance for ADL's. His appetite is poor, and he reports 80 pounds of involuntary weight loss over the past year. He has right flank and generalized pain, and is on a Morphine infusion per Dr Piotr Don, and is on MS ER 30 mg twice daily. The patient's sister is aware of how ill the patient is, but evidently the patient had kept most of the details re: his cancer to himself. A brother was upset this weekend due to the patient's continued decline. The patient had a brief course with hospice, but wanted to pursue additional curative therapy and revoked.  I discussed with the patient's sister that he is very ill, with a life expectancy likely of days to weeks. He will need 24 hour care giving, and may meet General Inpatient Hospice criteria, although the patient's sister does not know if he will consent. The hospice nurse liaison followed up with the patient and family, and the [facility] is [facility] pain. Hospice philosophy was discussed regarding care and comfort at the end of life. Questions were answered, and emotional support was provided. They are aware that Hospice does not provide for the patients 24 hour care giving needs. The patient is DNR-comfort care status.     Past Medical History:   Diagnosis Date    Anxiety     Back problem     \"Lower Back Hurts Sometimes\"    Bipolar disorder ([facility] [address]     Bradycardia     per old chart( see notes Dr Claudette Plumber 11/2017) pt had bradycardia on EKG - lexiscan showed ischemia ant wall- was scheduled for cath- no show- then noted consult with Dr Pollo Martin 12/1/2017 in old chart- pt to consider having pacemaker insertion( per pt on 1/25/2018-\"have not decided if I want this done or not\"    Depression     Former smoker 11/29/2017    History of exercise stress test 09/13/2017    treadmill    History of nuclear stress test 09/14/2017    cardiolite-moderate ischemia anterior wall LAD    Hx of echocardiogram 09/14/2017    EF50-55%,mildly dilated left atrium    HX OTHER MEDICAL     [facility] At [facility]    Lung nodule 11/29/2017    Metastatic cancer to spine ([facility] [facility] 5/21/2019    Panic attacks     Right Kidney Cancer     Renal cell carcinoma--with mets to T11 vertebrae    Shortness of breath on exertion     Teeth missing     Upper And Lower    Wears glasses        Past Surgical History:   Procedure Laterality Date    BONE BIOPSY N/A 01/26/2018    Dr Saucedo Na   T-11   [address]  11/06/2017    Dr. Ophelia Harvey Right 06/24/14       Social History     Socioeconomic History    Marital status: Single     Spouse name: Not on file    Number of children: Not on file    Years of education: Not on file    Highest education level: Not on file   Occupational History    Not on file   Social Needs    Financial resource strain: Not on file    Food insecurity:     Worry: Not on file     Inability: Not on file    Transportation needs:     Medical: Not on file     Non-medical: Not on file   Tobacco Use    Smoking status: Former Smoker     Packs/day: 1.00     Years: 21.00     Pack years: 21.00     Types: Cigarettes     Last attempt to quit: 1992     Years since quittin.4    Smokeless tobacco: Never Used   Substance and Sexual Activity    Alcohol use: No     Comment: \"Quit , Was Occ\"\"use to drink every day- average 6-8 per day\"    Drug use: No     Comment: \"when younger- last used age 45's\"    Sexual activity: Not Currently   Lifestyle    Physical activity:     Days per week: Not on file     Minutes per session: Not on file    Stress: Not on file   Relationships    Social connections:     Talks on phone: Not on file     Gets together: Not on file     Attends Confucianism service: Not on file     Active member of club or organization: Not on file     Attends meetings of clubs or organizations: Not on file     Relationship status: Not on file    Intimate partner violence:     Fear of current or ex partner: Not on file     Emotionally abused: Not on file     Physically abused: Not on file     Forced sexual activity: Not on file   Other Topics Concern    Not on file   Social History Narrative    ** Merged History Encounter **            Family History   Problem Relation Age of Onset    Heart Attack Father     High Cholesterol Father     Cancer Father         Skin Cancer    Heart Disease Father         Heart Attack    Early Death Brother 58        Throat Cancer    Cancer Brother         Throat Cancer    Other Sister         Rheumatic Fever, Pituitary Gland Problems    Other Brother         Overweight, Back Problems       No Known Allergies    Medication list reviewed  Prior to Admission medications    Medication Sig Start Date End Date Taking? Authorizing Provider   dexamethasone (DECADRON) 4 MG tablet TAKE ONE TABLET BY MOUTH THREE TIMES A DAY 5/15/19   Historical Provider, MD   dronabinol (MARINOL) 5 MG capsule TAKE ONE CAPSULE BY MOUTH TWO TIMES A DAY, BEFORE LUNCH AND EVENING MEAL/DINNER 4/29/19   Historical Provider, MD   DULoxetine (CYMBALTA) 30 MG extended release capsule TAKE ONE CAPSULE BY MOUTH ONCE EVERY DAY 4/25/19   Historical Provider, MD   mirtazapine (REMERON) 15 MG tablet Take 7.5 mg by mouth daily 4/22/19   Historical Provider, MD   morphine (MS CONTIN) 30 MG extended release tablet Take 1 tablet by mouth every 12 hours as needed. 5/8/19   Historical Provider, MD   traZODone (DESYREL) 50 MG tablet Take 50 mg by mouth nightly 4/22/19   Historical Provider, MD   Nutritional Supplements (ENSURE NUTRITION SHAKE) LIQD  5/4/18   Historical Provider, MD   lidocaine viscous (XYLOCAINE) 2 % solution  5/17/18   Historical Provider, MD   VOTRIENT 200 MG chemo tablet  5/10/18   Historical Provider, MD   oxyCODONE (ROXICODONE) 5 MG immediate release tablet Take 5 mg by mouth every 4 hours as needed for Pain.     Historical Provider, MD   vitamin C (ASCORBIC ACID) 500 MG tablet Take 500 mg by mouth daily    Historical Provider, MD   ferrous sulfate 325 (65 Fe) MG tablet Take 325 mg by mouth daily (with breakfast)    Historical Provider, MD   sertraline (ZOLOFT) 100 MG tablet Take 100 mg by mouth Take 2 tablets daily    Historical Provider, MD       ROS: As noted in [place], all other systems are limited due to the patient's clinical condition but reviewed as able with staff and family, and are negative or as follows:  Constitutional: generally weak, + weight loss  HEENT:  No acute visual changes, nasal drainage,   CV:  No chest pain, palpitations  PULM:  Occasional cough, no shortness of breath, hemoptysis  GI:  Poor appetite, + involuntary weight loss  :  renal cell cancer, and right flank pain  Musculoskeletal:  Generally weak with frequent falls  Neuro: less alert  Skin:  No rash    Weight:    Wt Readings from Last 3 Encounters:   05/21/19 137 lb 6.4 oz (62.3 kg)   05/29/18 143 lb (64.9 kg)   02/19/18 165 lb (74.8 kg)       Data reviewed 5/21/2019:  Pathology January 2018: Final Pathologic Diagnosis:  Bone, T11, needle biopsy:  -  METASTATIC CARCINOMA CONSISTENT WITH RENAL CELL CARCINOMA    PET CT 5/14/18:  1. Heterogeneous FDG activity associated with the right renal mass compatible with history of renal cell carcinoma with metabolically active metastatic retroperitoneal and retrocrural lymphadenopathy and skeletal metastasis at T11.   2. Focally increased FDG activity in the left adrenal gland could represent early metastatic disease. Attention on follow-up. MRI Brain 5/14/19:  1. Development of a 4 mm enhancing mass within the left posterior frontal/parietal lobe mild surrounding vasogenic edema. No significant mass effect or midline shift. 2. Otherwise, no acute intracranial abnormality. No acute infarct. 3. Mild global parenchymal volume loss with chronic microvascular ischemic changes. CT chest, abdomen and pelvis: 4/17/19:  1. Overall progression of disease at the upper pole right kidney, mass noticeably larger. 2. Overall interval progression of metastatic disease involving T11, the retrocrural region and retroperitoneum. CT LS Spine 5/17/19:  No acute findings. Metastatic disease without osseous destruction of T11 similar to the prior CT. Remote L1-L3 transverse process fractures. Remote T12 rib fracture.      Hepatic Function Panel:    Lab Results   Component Value Date    ALKPHOS 117 05/18/2019    ALT 10 05/18/2019    AST 9 05/18/2019    PROT 4.7 05/18/2019    BILITOT 0.4 05/18/2019    BILIDIR 0.2 02/19/2018    IBILI 0.1 02/19/2018    LABALBU 2.0 05/18/2019 CBC:   Recent Labs     05/19/19  0604 05/20/19  0526 05/21/19  0631   WBC 17.2* 15.2* 13.4*   HGB 10.3* 10.2* 9.4*   HCT 38.0* 36.8* 34.7*   MCV 88.2 86.0 86.8    360 316     BMP:    Lab Results   Component Value Date     05/18/2019    K 3.7 05/18/2019    CL 97 05/18/2019    CO2 27 05/18/2019    BUN 9 05/18/2019    LABALBU 2.0 05/18/2019    CREATININE 0.4 05/18/2019    CALCIUM 9.4 05/18/2019    GFRAA >60 05/18/2019    LABGLOM >60 05/18/2019    GLUCOSE 96 05/18/2019       Physical Exam:   /76   Pulse 82   Temp 97.3 °F (36.3 °C) (Oral)   Resp 16   Ht 6' 2\" (1.88 m)   Wt 137 lb 6.4 oz (62.3 kg)   SpO2 93%   BMI 17.64 kg/m²   General: lying on left side, awakens to voice, very weak, appears chronically ill, thin and cachectic,   HEENT: Mucous membranes are dry, sclerae are clear, bitemporal wasting  Neck: no thyromegaly or bruit  Chest: intercostal wasting   Heart: RRR, S1S2, no murmurs  Lungs:  Equal breath sounds bilaterally, diminished at the bases, without rales, rhonchi   Abdomen: soft, bowel sounds quietly present, no guarding, nondistended  Extremities:  No mottling, thin extremities  Neurologic: lethargic, generally weak    Assessment/Plan:  1. Stage IV right renal cell cancer with osseous and cerebral metastases, diagnosed January 2018 with progressive disease. The patient is admitted to [facility]. 2. Cancer pain secondary to above  3. Severe protein calorie malnutrition/cancer cachexia  4. DNR-comfort care      Patient Active Problem List   Diagnosis Code    History of dysplastic nevus Z86.018    Nevus of multiple sites D22.9    Bradycardia R00.1    Insomnia disorder G47.00    Chronic fatigue R53.82    SOB (shortness of breath) R06.02    Lung nodule R91.1    Former smoker Z87.891    Renal cell carcinoma of right kidney ([address] C64.1    Fall W19. XXXA    Severe malnutrition ([facility] [address] E43    Metastatic cancer to spine ([facility] [address] C79.51    Hospice care Z51.5    Cancer related pain K39.8         Certification of Terminal Illness: I certify that this patient is eligible for Hospice services for a terminal diagnosis of Stage IV right renal cell cancer with osseous and cerebral metastases, diagnosed January 2018 with progressive disease with a life expectancy predicted to be less than 6 months if this illness follows its expected course.     Micky Schlatter, MD, [facility]

## 2019-05-21 NOTE — PROGRESS NOTES
[address] [place] [address]  General Inpatient Hospice Progress Note    Date: 5/21/2019  Name: Giorgio Bear  MRN: [id number]  YOB: 1955   Patient's PCP: DAVIS Connor CNP   Oncology: Dr Ayush Larose Date: 5/21/2019 to General Inpatient Hospice  Acute care admission date: 5/17 to 5/21/2019     Subjective: The patient is more comfortable, responds to exam, but minimal interaction. He did ask for a drink of water, and I assisted him, and he tolerated without choking or coughing. Oral intake is very poor. His pain is better controlled as long as he doesn't move much. No family are here. Objective:   Pain is managed with MS ER 30 mg orally twice daily and Morphine infusion, currently at 1 mg/hr and no doses of breakthrough pain med, Haloperidol for nausea and delirium and Lorazepam is available for anxiety. Physical Exam:   /78   Pulse 88   Temp 97.7 °F (36.5 °C) (Oral)   Resp 18   SpO2 94%   General: opens eyes to voice, appears chronically ill, thin  HEENT: Mucous membranes are dry, bitemporal wasting  Chest: intercostal wasting  Heart: RRR, S1S2, no murmurs  Lungs:  Equal breath sounds bilaterally, diminished at the bases, without rales, rhonchi   Abdomen: soft, bowel sounds quietly present, no guarding, nondistended  Extremities:  No mottling, thin extremities  Neurologic: lethargic, generally weak    Assessment/Plan:     1. Stage IV right renal cell cancer with osseous and cerebral metastases, diagnosed January 2018 with progressive disease. The patient is continued on [address] [place] [address], and is declining with poor oral intake, progressive weakness. The  hospice social worker to assist with disposition, perhaps late week or early next week based on clinical course. I collaborated with the patient's nurse and the hospice nurse. 2. Cancer pain secondary to above.  For now will continue MS ER as he is swallowing, and if he stabilizes will need adjusted for

## 2019-05-21 NOTE — CONSULTS
[facility] Consult Note    Date: 5/21/2019  Name: Ji Brown  MRN: [id number]  YOB: 1955   Patient's PCP: DAVIS Huerta CNP   Oncology: Dr Gillian Gamez  Referring Physician: Dr Kyle Sanchez care admission date: 5/17/2019     Informant: Chart reviewed, discussed with case management, YAEL Lester RN, and I met with the patient at the bedside. There are no family here, but I did phone the patient's sister, Nirmal Burroughs at [phone]) with the patient's permission. The patient is lethargic and does not provide much information. CC: weakness, cancer     [place]: This is a 58 y.o. male with Stage IV right renal cell cancer diagnosed in January 2018 with osseous (spinal metastases) and newly found 4 mm left frontoparietal enhancing mass on MRI brain. The patient had been on immunotherapy, and has had progressive decline. The patient was admitted on 5/17/2019 from the cancer center with weakness, falls, failure to thrive. Dr Gillian Gamez has requested hospice evaluation. The patient is very weak and lethargic. He has generalized weakness, and had frequent falls prior to admission. The patient lives alone, with help from his sister. He has had increasing care needs and assistance for ADL's. His appetite is poor, and he reports 80 pounds of involuntary weight loss over the past year. He has right flank and generalized pain, and is on a Morphine infusion per Dr Nida Isidro, and is on MS ER 30 mg twice daily. The patient's sister is aware of how ill the patient is, but evidently the patient had kept most of the details re: his cancer to himself. A brother was upset this weekend due to the patient's continued decline. The patient had a brief course with hospice, but wanted to pursue additional curative therapy and revoked. I discussed with the patient's sister that he is very ill, with a life expectancy likely of days to weeks.  He will need 24 hour care giving, and may meet General Inpatient Hospice criteria, although the patient's sister does not know if he will consent. The hospice nurse liaison will follow up later. Hospice philosophy was discussed regarding care and comfort at the end of life. Questions were answered, and emotional support was provided. They are aware that Hospice does not provide for the patients 24 hour care giving needs. The patient is DNR-comfort care status.     Past Medical History:   Diagnosis Date    Anxiety     Back problem     \"Lower Back Hurts Sometimes\"    Bipolar disorder (HCC)     Bradycardia     per old chart( see notes Dr Zaina Del Real 11/2017) pt had bradycardia on EKG - lexiscan showed ischemia ant wall- was scheduled for cath- no show- then noted consult with Dr Higinio Kohli 12/1/2017 in old chart- pt to consider having pacemaker insertion( per pt on 1/25/2018-\"have not decided if I want this done or not\"    Depression     Former smoker 11/29/2017    History of exercise stress test 09/13/2017    treadmill    History of nuclear stress test 09/14/2017    cardiolite-moderate ischemia anterior wall LAD    Hx of echocardiogram 09/14/2017    EF50-55%,mildly dilated left atrium    HX OTHER MEDICAL     [facility] Physician [facility] At [facility]    Lung nodule 11/29/2017    Panic attacks     Right Kidney Cancer     Renal cell carcinoma--with mets to T11 vertebrae    Shortness of breath on exertion     Teeth missing     Upper And Lower    Wears glasses        Past Surgical History:   Procedure Laterality Date    BONE BIOPSY N/A 01/26/2018    Dr Masoud De Santiago   T-11   [place]  11/06/2017    Dr. Marylin Watts Right 06/24/14       Social History     Socioeconomic History    Marital status: Single     Spouse name: Not on file    Number of children: Not on file    Years of education: Not on file    Highest education level: Not on file   Occupational History    Not on file Social Needs    Financial resource strain: Not on file    Food insecurity:     Worry: Not on file     Inability: Not on file    Transportation needs:     Medical: Not on file     Non-medical: Not on file   Tobacco Use    Smoking status: Former Smoker     Packs/day: 1.00     Years: 21.00     Pack years: 21.00     Types: Cigarettes     Last attempt to quit: 1992     Years since quittin.4    Smokeless tobacco: Never Used   Substance and Sexual Activity    Alcohol use: No     Comment: \"Quit , Was Occ\"\"use to drink every day- average 6-8 per day\"    Drug use: No     Comment: \"when younger- last used age 45's\"    Sexual activity: Not Currently   Lifestyle    Physical activity:     Days per week: Not on file     Minutes per session: Not on file    Stress: Not on file   Relationships    Social connections:     Talks on phone: Not on file     Gets together: Not on file     Attends Restoration service: Not on file     Active member of club or organization: Not on file     Attends meetings of clubs or organizations: Not on file     Relationship status: Not on file    Intimate partner violence:     Fear of current or ex partner: Not on file     Emotionally abused: Not on file     Physically abused: Not on file     Forced sexual activity: Not on file   Other Topics Concern    Not on file   Social History Narrative    ** Merged History Encounter **            Family History   Problem Relation Age of Onset    Heart Attack Father     High Cholesterol Father     Cancer Father         Skin Cancer    Heart Disease Father         Heart Attack    Early Death Brother 58        Throat Cancer    Cancer Brother         Throat Cancer    Other Sister         Rheumatic Fever, Pituitary Gland Problems    Other Brother         Overweight, Back Problems       No Known Allergies    Medication list reviewed  Prior to Admission medications    Medication Sig Start Date End Date Taking?  Authorizing Provider dexamethasone (DECADRON) 4 MG tablet TAKE ONE TABLET BY MOUTH THREE TIMES A DAY 5/15/19   Historical Provider, MD   dronabinol (MARINOL) 5 MG capsule TAKE ONE CAPSULE BY MOUTH TWO TIMES A DAY, BEFORE LUNCH AND EVENING MEAL/DINNER 4/29/19   Historical Provider, MD   DULoxetine (CYMBALTA) 30 MG extended release capsule TAKE ONE CAPSULE BY MOUTH ONCE EVERY DAY 4/25/19   Historical Provider, MD   mirtazapine (REMERON) 15 MG tablet Take 7.5 mg by mouth daily 4/22/19   Historical Provider, MD   morphine (MS CONTIN) 30 MG extended release tablet Take 1 tablet by mouth every 12 hours as needed. 5/8/19   Historical Provider, MD   traZODone (DESYREL) 50 MG tablet Take 50 mg by mouth nightly 4/22/19   Historical Provider, MD   Nutritional Supplements (ENSURE NUTRITION SHAKE) LIQD  5/4/18   Historical Provider, MD   lidocaine viscous (XYLOCAINE) 2 % solution  5/17/18   Historical Provider, MD   VOTRIENT 200 MG chemo tablet  5/10/18   Historical Provider, MD   oxyCODONE (ROXICODONE) 5 MG immediate release tablet Take 5 mg by mouth every 4 hours as needed for Pain.     Historical Provider, MD   vitamin C (ASCORBIC ACID) 500 MG tablet Take 500 mg by mouth daily    Historical Provider, MD   ferrous sulfate 325 (65 Fe) MG tablet Take 325 mg by mouth daily (with breakfast)    Historical Provider, MD   sertraline (ZOLOFT) 100 MG tablet Take 100 mg by mouth Take 2 tablets daily    Historical Provider, MD       ROS: As noted in [facility], all other systems are limited due to the patient's clinical condition but reviewed as able with staff and family, and are negative or as follows:  Constitutional: generally weak, + weight loss  HEENT:  No acute visual changes, nasal drainage,   CV:  No chest pain, palpitations  PULM:  Occasional cough, no shortness of breath, hemoptysis  GI:  Poor appetite, + involuntary weight loss  :  renal cell cancer, and right flank pain  Musculoskeletal:  Generally weak with frequent falls  Neuro: less alert  Skin:  Intussusception ([facility] K56.1    Fall W19. XXXA    Severe malnutrition ([facility] V94         Certification of Terminal Illness: I certify that this patient is eligible for Hospice services for a terminal diagnosis of Stage IV right renal cell cancer with osseous and cerebral metastases, diagnosed January 2018 with progressive disease with a life expectancy predicted to be less than 6 months if this illness follows its expected course.     Daniel Smith MD, [facility]

## 2019-05-22 NOTE — PROGRESS NOTES
Pt seen and examined  Pain controlled but feels very weak  No oral intake    Cachexia  Somnolent  ctab  s1s2  bs pos    Appreciate hospice recs and care  ERLINDA

## 2019-05-24 NOTE — PROGRESS NOTES
[address] [place] [address] Inpatient Hospice Progress Note    Date: 5/24/2019  Name: Ji Brown  MRN: [id number]  YOB: 1955   Patient's PCP: DAVIS Huerta CNP   Oncology: Dr Vela Knock Date: 5/21/2019 to General Inpatient Hospice  Acute care admission date: 5/17 to 5/21/2019     Subjective: The patient is transitioning. He briefly opened his eyes, no conversation, and not alert enough to safely take oral meds or diet. He is intermittently restless, and has received Haloperidol, and the IV Morphine infusion has tod increased. I collaborated with the patient's nurse and the hospice nurse. No family are here. Objective:   Pain is managed with Morphine infusion, now at 1.5  mg/hr and 2 doses of breakthrough pain med. Haloperidol is available for nausea and delirium and Lorazepam is available for anxiety. Physical Exam:   /74   Pulse 94   Temp 98.7 °F (37.1 °C) (Axillary)   Resp 16   Ht 6' 2\" (1.88 m)   Wt 137 lb (62.1 kg)   SpO2 94%   BMI 17.59 kg/m²   General: minimally responsive, briefly opened his eyes, mildly restless, appears chronically ill, thin  HEENT: Mucous membranes are dry, bitemporal wasting  Chest: intercostal wasting  Heart: tachycardic RRR, S1S2, no murmurs  Lungs:  Equal breath sounds bilaterally, diminished at the bases, without rales, scattered rhonchi   Abdomen: soft, bowel sounds quietly present, no guarding, nondistended  Extremities:  No mottling, thin extremities  Neurologic: stuporous, picking in the air at times    Assessment/Plan:     1. Stage IV right renal cell cancer with osseous and cerebral metastases, diagnosed January 2018 with progressive disease. The patient is transitioning and is continued on [facility]. I collaborated with the patient's nurse and the hospice nurse. .  2. Cancer pain secondary to above.  Unable to take MS ER, continue the Morphine infusion plus prn for breakthrough pain  3. Terminal delirium: add scheduled Haloperidol. 4. Severe protein calorie malnutrition/cancer cachexia  5. DNR-comfort care  6. Holiday weekend coverage for General Inpatient Hospice patients for Dr. Joel Khan: Dr.Wendy Maria Luisa Lau will cover 5/24 at 1700 to 5/27 at 0800 and Dr. Denisse Palomares will cover 5/27 at 0800 to 5/28 at 0800. Dr Maria Luisa Lau and Dr Denisse Palomares can be reached through [employer]. Patient Active Problem List   Diagnosis Code    History of dysplastic nevus Z86.018    Nevus of multiple sites D22.9    Bradycardia R00.1    Insomnia disorder G47.00    Chronic fatigue R53.82    SOB (shortness of breath) R06.02    Lung nodule R91.1    Former smoker Z87.891    Renal cell carcinoma of right kidney ([address] C64.1    Fall W19. XXXA    Severe malnutrition ([address] E43    Metastatic cancer to spine ([address] C79.51    Hospice care Z51.5    Cancer related pain G89.3    Renal cell cancer, right ([address] C64.1       NORMAN Salguero MD, [facility]  5/24/2019

## 2019-05-25 NOTE — PROGRESS NOTES
[address] [place] [address]  General Inpatient Hospice Progress Note    Date: 5/25/2019  Name: Denis Benavides  MRN: [id number]  YOB: 1955   Patient's PCP: DAVIS Sandra CNP  Admit Date: 5/21/2019 to General Inpatient Hospice    Subjective:   Patient is lying across the bed with his legs on the ground and his head hanging off the other side of the bed. I call RN and she comes in and we reposition him. He does not have a gown on.  He's been extremely restless and agitated. Patient's been minimally responsive and not tolerating much orally. Objective:   Morphine 1.5 mg continuous infusion and Haldol 1 mg 4 times a day  When necessary morphine IV ×3, Ativan IV ×4 and Haldol x2/24hrs    Physical Exam:   BP (!) 153/91   Pulse 81   Temp 97.6 °F (36.4 °C) (Axillary)   Resp 17   Ht 6' 2\" (1.88 m)   Wt 125 lb 1.6 oz (56.7 kg)   SpO2 91%   BMI 16.06 kg/m²   General: Patient's eyes are open. He is lying across the bed. He does not have a gallon on. He does not make eye contact or follow commands. He does not verbalize. He is agitated and restless. The bed has the legs elevated to encourage him to stay in bed. He is cachectic and frail. He is ill in appearance. HEENT: Mucous membranes are dry   Heart: Regular  Lungs: Shallow without phoenix rhonchi  Abdomen: Soft thin NT ND few BS  Extremities: No edema, cool, deeply mottled up onto his abdomen  Swann:Lorrie    PMH metastatic renal cancer  Past surgical history inguinal hernia, cardiac catheterization  Social history single, 21-pack-year smoker  Family history father with CAD, Brother with throat cancer  Allergies none  I reviewed medication list   DNR CC    PET scan right renal mass, lymphadenopathy, bony metastasis, adrenal mass  MRI of the brain 4 mm metastatic lesion  WBC 13.4 Hgb 9.4     Assessment/Plan:     1. Metastatic renal cancer with bone and brain metastases  2. Uncontrolled pain  3. Terminal agitation  4.  Severe

## 2019-05-26 NOTE — PROGRESS NOTES
[address] [place] [address]  General Inpatient Hospice Progress Note    Date: 5/26/2019  Name: Giorgio Bear  MRN: [id number]  YOB: 1955   Patient's PCP: DAVIS Connor - CNP  Admit Date: 5/21/2019 to General Inpatient Hospice    Subjective: The patient Is lying in bed. He is more congested but not as agitated. He awakens for exam but does not verbalize follow commands or make eye contact. RN notes patient was very restless yesterday but today is looking more comfortable. He has new rhonchi and Robinul was activated. Objective:   Morphine gtt 2mg/hr, Haldol 1mg IV every 4hrs   PRN Tylenol x1, Ativan and Morphine x3 and Robinul x1/24hrs    Physical Exam:   /67   Pulse 104   Temp 97.3 °F (36.3 °C) (Axillary)   Resp 24   Ht 6' 2\" (1.88 m)   Wt 125 lb 1.6 oz (56.7 kg)   SpO2 (!) 87%   BMI 16.06 kg/m²   General: Cachectic elderly ill-appearing gentleman. He tries to cough. Tattoo left upper chest.  He is lying in bed without a gown and she does retirement off but he looks more comfortable today than he did yesterday. HEENT: Mucous membranes are dry   Heart: Tachycardic  Lungs: Increased congestion with rhonchi  Abdomen: Soft thin NT ND no BS  Extremities: No edema, cool, deeply mottled up onto his abdomen  Swann adam    PMH metastatic renal cancer  Past surgical history inguinal hernia, cardiac catheterization  Social history single, 21-pack-year smoker  Family history father with CAD, Brother with throat cancer  Allergies none  I reviewed medication list   DNR CC     PET scan right renal mass, lymphadenopathy, bony metastasis, adrenal mass  MRI of the brain 4 mm metastatic lesion  WBC 13.4 Hgb 9.4      Assessment/Plan:     1. Metastatic renal cancer with bone and brain metastases  2. Uncontrolled pain  3. Terminal agitation  4. Severe malnutrition  5. Cachexia with weight 137 pounds and a 30 pound weight loss in the last 3 months  6.  DVT prophylaxis: none given Hospice care  7. Discussed with RN  8. No family currently at bedside  9. morphine continuous infusion to 2 mg an hour  10. Haldol 1 mg every 4 hours  11. Versed 2 mg every 4 hours when necessary for severe agitation  12. End-of-life care with prognosis expected in terms of hours to days        Patient Active Problem List   Diagnosis Code    History of dysplastic nevus Z86.018    Nevus of multiple sites D22.9    Bradycardia R00.1    Insomnia disorder G47.00    Chronic fatigue R53.82    SOB (shortness of breath) R06.02    Lung nodule R91.1    Former smoker Z87.891    Renal cell carcinoma of right kidney ([facility] C64.1    Fall W19. XXXA    Severe malnutrition ([address] E43    Metastatic cancer to spine ([facility] C79.51    Hospice care Z51.5    Cancer related pain G89.3    Renal cell cancer, right [facility]) C64.1       Electronically signed by Matteo Canales MD on 5/26/2019 at 11:08 AM no

## 2019-05-27 NOTE — DISCHARGE SUMMARY
Discharge Summary    Name:  Mansoor Huffman /Age/Sex: 1955  [de-identified]58 y.o. male)   MRN & CSN:  [id number] & [id number] Admission Date/Time: 2019  1:01 PM   Attending:  No att. providers found Discharging Physician: Andre Muniz MD     Hospital Course:     Discharge diagnoses    Stage IV renal cancer with bone and cerebral metastasis  Chronic pain  Moderate protein caloric malnutrition    Max D Migdalia Noble is a 58 y.o. male who presents with fall and fatigue     57-year-old male presents with fall and fatigue, recurrent 14 times in 2 weeks. He has been diagnosed with metastatic renal cancer and is on chemotherapy. Patient on presentation found to be significantly debilitated and in significant distress . He was reviewed by hematology oncology and he was deemed to have poor prognosis. Palliative care consulted and the discussion had with the family to make him hospice care. Patient made hospice care and transfer to inpatient hospice.     Consults this admission:  IP CONSULT TO CASE MANAGEMENT  IP CONSULT TO HOSPITALIST  IP CONSULT TO DIETITIAN  IP CONSULT TO ONCOLOGY  IP CONSULT TO PALLIATIVE CARE  IP CONSULT TO PALLIATIVE CARE  IP CONSULT TO [address] [place] [address]    Discharge Instruction:       Diet:  regular diet   Activity: bedrest  Disposition: Discharged to:   []Home, []C, []SNF, []Acute Rehab, [x]Hospice   Condition on discharge: Ominous    Discharge Medications:      Lauren Otoole   Brick Medication Instructions [id number]    Printed on:19 3038   Medication Information                      dexamethasone (DECADRON) 4 MG tablet  TAKE ONE TABLET BY MOUTH THREE TIMES A DAY             dronabinol (MARINOL) 5 MG capsule  TAKE ONE CAPSULE BY MOUTH TWO TIMES A DAY, BEFORE LUNCH AND EVENING MEAL/DINNER             DULoxetine (CYMBALTA) 30 MG extended release capsule  TAKE ONE CAPSULE BY MOUTH ONCE EVERY DAY             ferrous sulfate 325 (65 Fe) MG tablet  Take 325 mg by mouth daily (with breakfast) lidocaine viscous (XYLOCAINE) 2 % solution               mirtazapine (REMERON) 15 MG tablet  Take 7.5 mg by mouth daily             morphine (MS CONTIN) 30 MG extended release tablet  Take 1 tablet by mouth every 12 hours as needed. Nutritional Supplements (ENSURE NUTRITION SHAKE) LIQD               oxyCODONE (ROXICODONE) 5 MG immediate release tablet  Take 5 mg by mouth every 4 hours as needed for Pain. sertraline (ZOLOFT) 100 MG tablet  Take 100 mg by mouth Take 2 tablets daily             traZODone (DESYREL) 50 MG tablet  Take 50 mg by mouth nightly             vitamin C (ASCORBIC ACID) 500 MG tablet  Take 500 mg by mouth daily             VOTRIENT 200 MG chemo tablet                   Objective Findings at Discharge:   /79   Pulse 80   Temp 97.8 °F (36.6 °C) (Oral)   Resp 16   Ht 6' 2\" (1.88 m)   Wt 137 lb 6.4 oz (62.3 kg)   SpO2 96%   BMI 17.64 kg/m²            PHYSICAL EXAM     General -awake but lethargic, underweight  Psych - awake but lethargic  Eyes - Eye lids intact. No scleral icterus  ENT - Lips wnl. External ear clear/dry/intact. No thyromegaly on inspection  Neuro - No gross peripheral or central neuro deficits on inspection  Heart - Sinus. RRR. S1 and S2 present. No added HS/murmurs appreciated. No elevated JVD appreciated  Lung - Adequate air entry b/l, No crackles/wheezes appreciated  GI -  Soft. No guarding/rigidity. No hepatosplenomegaly/ascites. BS+  Skin - Intact. No rash/petechiae/ecchymosis. Warm extremities  MSK - Joints with normal ROM. Loss of muscle bulk    BMP/CBC  No results for input(s): NA, K, CL, CO2, BUN, CREATININE, WBC, HEMOGLOBIN, HCT, PLT in the last 72 hours.     Invalid input(s): GLU      Discharge Time of 35 minutes    Electronically signed by Cici Tomas MD on 5/27/2019 at 6:15 PM

## 2019-05-27 NOTE — PROGRESS NOTES
Sister Nirmal Burroughs came to the floor and took patients wallet keys,cane,clothes,cell phone, and shoes home with her no belonging down to [facility]